# Patient Record
Sex: MALE | Race: WHITE | NOT HISPANIC OR LATINO | URBAN - METROPOLITAN AREA
[De-identification: names, ages, dates, MRNs, and addresses within clinical notes are randomized per-mention and may not be internally consistent; named-entity substitution may affect disease eponyms.]

---

## 2017-01-26 ENCOUNTER — ALLSCRIPTS OFFICE VISIT (OUTPATIENT)
Dept: OTHER | Facility: OTHER | Age: 67
End: 2017-01-26

## 2017-03-16 ENCOUNTER — ALLSCRIPTS OFFICE VISIT (OUTPATIENT)
Dept: OTHER | Facility: OTHER | Age: 67
End: 2017-03-16

## 2017-04-05 ENCOUNTER — ALLSCRIPTS OFFICE VISIT (OUTPATIENT)
Dept: OTHER | Facility: OTHER | Age: 67
End: 2017-04-05

## 2017-04-10 ENCOUNTER — ALLSCRIPTS OFFICE VISIT (OUTPATIENT)
Dept: OTHER | Facility: OTHER | Age: 67
End: 2017-04-10

## 2017-05-11 ENCOUNTER — LAB REQUISITION (OUTPATIENT)
Dept: LAB | Facility: HOSPITAL | Age: 67
End: 2017-05-11
Payer: MEDICARE

## 2017-05-11 ENCOUNTER — ALLSCRIPTS OFFICE VISIT (OUTPATIENT)
Dept: OTHER | Facility: OTHER | Age: 67
End: 2017-05-11

## 2017-05-11 DIAGNOSIS — Z12.5 ENCOUNTER FOR SCREENING FOR MALIGNANT NEOPLASM OF PROSTATE: ICD-10-CM

## 2017-05-11 DIAGNOSIS — M25.50 PAIN IN JOINT: ICD-10-CM

## 2017-05-11 DIAGNOSIS — E03.9 HYPOTHYROIDISM: ICD-10-CM

## 2017-05-11 DIAGNOSIS — I10 ESSENTIAL (PRIMARY) HYPERTENSION: ICD-10-CM

## 2017-05-11 DIAGNOSIS — M10.9 GOUT: ICD-10-CM

## 2017-05-11 DIAGNOSIS — M54.50 LOW BACK PAIN: ICD-10-CM

## 2017-05-11 DIAGNOSIS — Z95.2 PRESENCE OF PROSTHETIC HEART VALVE: ICD-10-CM

## 2017-05-11 DIAGNOSIS — M25.512 PAIN IN LEFT SHOULDER: ICD-10-CM

## 2017-05-11 DIAGNOSIS — G47.33 OBSTRUCTIVE SLEEP APNEA: ICD-10-CM

## 2017-05-11 DIAGNOSIS — E11.49 TYPE 2 DIABETES MELLITUS WITH OTHER DIABETIC NEUROLOGICAL COMPLICATION (HCC): ICD-10-CM

## 2017-05-11 DIAGNOSIS — M15.9 POLYOSTEOARTHRITIS: ICD-10-CM

## 2017-05-11 DIAGNOSIS — I08.0 RHEUMATIC DISORDER OF BOTH MITRAL AND AORTIC VALVES: ICD-10-CM

## 2017-05-11 LAB
ALBUMIN SERPL BCP-MCNC: 4.2 G/DL (ref 3.5–5)
ALP SERPL-CCNC: 61 U/L (ref 46–116)
ALT SERPL W P-5'-P-CCNC: 24 U/L (ref 12–78)
ANION GAP SERPL CALCULATED.3IONS-SCNC: 8 MMOL/L (ref 4–13)
AST SERPL W P-5'-P-CCNC: 17 U/L (ref 5–45)
BASOPHILS # BLD AUTO: 0.1 THOUSANDS/ΜL (ref 0–0.1)
BASOPHILS NFR BLD AUTO: 1 % (ref 0–1)
BILIRUB SERPL-MCNC: 0.6 MG/DL (ref 0.2–1)
BILIRUB UR QL STRIP: NEGATIVE
BUN SERPL-MCNC: 18 MG/DL (ref 5–25)
CALCIUM SERPL-MCNC: 9.5 MG/DL (ref 8.3–10.1)
CHLORIDE SERPL-SCNC: 96 MMOL/L (ref 100–108)
CHOLEST SERPL-MCNC: 184 MG/DL (ref 50–200)
CLARITY UR: NORMAL
CO2 SERPL-SCNC: 28 MMOL/L (ref 21–32)
COLOR UR: CLEAR
CREAT SERPL-MCNC: 0.89 MG/DL (ref 0.6–1.3)
EOSINOPHIL # BLD AUTO: 0.1 THOUSAND/ΜL (ref 0–0.61)
EOSINOPHIL NFR BLD AUTO: 1 % (ref 0–6)
ERYTHROCYTE [DISTWIDTH] IN BLOOD BY AUTOMATED COUNT: 13.4 % (ref 11.6–15.1)
ERYTHROCYTE [SEDIMENTATION RATE] IN BLOOD: 17 MM/HOUR (ref 2–10)
EST. AVERAGE GLUCOSE BLD GHB EST-MCNC: 123 MG/DL
GFR SERPL CREATININE-BSD FRML MDRD: >60 ML/MIN/1.73SQ M
GLUCOSE (HISTORICAL): NORMAL
GLUCOSE SERPL-MCNC: 159 MG/DL (ref 65–140)
HBA1C MFR BLD: 5.9 % (ref 4.2–6.3)
HCT VFR BLD AUTO: 42.3 % (ref 42–52)
HDLC SERPL-MCNC: 71 MG/DL (ref 40–60)
HGB BLD-MCNC: 14.2 G/DL (ref 14–18)
HGB UR QL STRIP.AUTO: NEGATIVE
KETONES UR STRIP-MCNC: NEGATIVE MG/DL
LDLC SERPL CALC-MCNC: 101 MG/DL (ref 0–100)
LEUKOCYTE ESTERASE UR QL STRIP: NEGATIVE
LYMPHOCYTES # BLD AUTO: 1.7 THOUSANDS/ΜL (ref 0.6–4.47)
LYMPHOCYTES NFR BLD AUTO: 27 % (ref 14–44)
MCH RBC QN AUTO: 34.1 PG (ref 27–31)
MCHC RBC AUTO-ENTMCNC: 33.6 G/DL (ref 31.4–37.4)
MCV RBC AUTO: 101 FL (ref 82–98)
MONOCYTES # BLD AUTO: 0.6 THOUSAND/ΜL (ref 0.17–1.22)
MONOCYTES NFR BLD AUTO: 9 % (ref 4–12)
NEUTROPHILS # BLD AUTO: 4 THOUSANDS/ΜL (ref 1.85–7.62)
NEUTS SEG NFR BLD AUTO: 62 % (ref 43–75)
NITRITE UR QL STRIP: NEGATIVE
NRBC BLD AUTO-RTO: 0 /100 WBCS
OCCULT BLD, FECAL IMMUNOLOGICAL (HISTORICAL): NEGATIVE
PH UR STRIP.AUTO: 5 [PH]
PLATELET # BLD AUTO: 242 THOUSANDS/UL (ref 130–400)
PMV BLD AUTO: 8.4 FL (ref 8.9–12.7)
POTASSIUM SERPL-SCNC: 4.8 MMOL/L (ref 3.5–5.3)
PROT SERPL-MCNC: 7.5 G/DL (ref 6.4–8.2)
PROT UR STRIP-MCNC: NEGATIVE MG/DL
PSA SERPL-MCNC: 1.1 NG/ML (ref 0–4)
RBC # BLD AUTO: 4.18 MILLION/UL (ref 4.7–6.1)
SODIUM SERPL-SCNC: 132 MMOL/L (ref 136–145)
SP GR UR STRIP.AUTO: 1.01
TRIGL SERPL-MCNC: 58 MG/DL
TSH SERPL DL<=0.05 MIU/L-ACNC: 1.13 UIU/ML (ref 0.36–3.74)
URATE SERPL-MCNC: 6 MG/DL (ref 4.2–8)
UROBILINOGEN UR QL STRIP.AUTO: NORMAL
WBC # BLD AUTO: 6.4 THOUSAND/UL (ref 4.8–10.8)

## 2017-05-11 PROCEDURE — 36415 COLL VENOUS BLD VENIPUNCTURE: CPT | Performed by: FAMILY MEDICINE

## 2017-05-11 PROCEDURE — 85025 COMPLETE CBC W/AUTO DIFF WBC: CPT | Performed by: FAMILY MEDICINE

## 2017-05-11 PROCEDURE — 85652 RBC SED RATE AUTOMATED: CPT | Performed by: FAMILY MEDICINE

## 2017-05-11 PROCEDURE — 80061 LIPID PANEL: CPT | Performed by: FAMILY MEDICINE

## 2017-05-11 PROCEDURE — G0103 PSA SCREENING: HCPCS | Performed by: FAMILY MEDICINE

## 2017-05-11 PROCEDURE — 84443 ASSAY THYROID STIM HORMONE: CPT | Performed by: FAMILY MEDICINE

## 2017-05-11 PROCEDURE — 80053 COMPREHEN METABOLIC PANEL: CPT | Performed by: FAMILY MEDICINE

## 2017-05-11 PROCEDURE — 83036 HEMOGLOBIN GLYCOSYLATED A1C: CPT | Performed by: FAMILY MEDICINE

## 2017-05-11 PROCEDURE — 84550 ASSAY OF BLOOD/URIC ACID: CPT | Performed by: FAMILY MEDICINE

## 2017-05-13 ENCOUNTER — GENERIC CONVERSION - ENCOUNTER (OUTPATIENT)
Dept: OTHER | Facility: OTHER | Age: 67
End: 2017-05-13

## 2017-08-10 ENCOUNTER — ALLSCRIPTS OFFICE VISIT (OUTPATIENT)
Dept: OTHER | Facility: OTHER | Age: 67
End: 2017-08-10

## 2017-08-10 DIAGNOSIS — E11.49 TYPE 2 DIABETES MELLITUS WITH OTHER DIABETIC NEUROLOGICAL COMPLICATION (HCC): ICD-10-CM

## 2017-08-10 LAB — HBA1C MFR BLD HPLC: 4.9 %

## 2017-10-17 ENCOUNTER — LAB REQUISITION (OUTPATIENT)
Dept: LAB | Facility: HOSPITAL | Age: 67
End: 2017-10-17
Payer: MEDICARE

## 2017-10-17 ENCOUNTER — ALLSCRIPTS OFFICE VISIT (OUTPATIENT)
Dept: OTHER | Facility: OTHER | Age: 67
End: 2017-10-17

## 2017-10-17 DIAGNOSIS — T39.395A ADVERSE EFFECT OF OTHER NONSTEROIDAL ANTI-INFLAMMATORY DRUGS (NSAID), INITIAL ENCOUNTER: ICD-10-CM

## 2017-10-17 DIAGNOSIS — M15.9 POLYOSTEOARTHRITIS: ICD-10-CM

## 2017-10-17 DIAGNOSIS — Z01.818 ENCOUNTER FOR OTHER PREPROCEDURAL EXAMINATION: ICD-10-CM

## 2017-10-17 DIAGNOSIS — Z95.2 PRESENCE OF PROSTHETIC HEART VALVE: ICD-10-CM

## 2017-10-17 DIAGNOSIS — G47.33 OBSTRUCTIVE SLEEP APNEA: ICD-10-CM

## 2017-10-17 DIAGNOSIS — I08.0 RHEUMATIC DISORDER OF BOTH MITRAL AND AORTIC VALVES: ICD-10-CM

## 2017-10-17 DIAGNOSIS — E03.9 HYPOTHYROIDISM: ICD-10-CM

## 2017-10-17 DIAGNOSIS — E11.49 TYPE 2 DIABETES MELLITUS WITH OTHER DIABETIC NEUROLOGICAL COMPLICATION (CODE): ICD-10-CM

## 2017-10-17 DIAGNOSIS — I10 ESSENTIAL (PRIMARY) HYPERTENSION: ICD-10-CM

## 2017-10-17 LAB
ALBUMIN SERPL BCP-MCNC: 4.2 G/DL (ref 3.5–5)
ALP SERPL-CCNC: 65 U/L (ref 46–116)
ALT SERPL W P-5'-P-CCNC: 27 U/L (ref 12–78)
ANION GAP SERPL CALCULATED.3IONS-SCNC: 9 MMOL/L (ref 4–13)
AST SERPL W P-5'-P-CCNC: 16 U/L (ref 5–45)
BASOPHILS # BLD AUTO: 0.1 THOUSANDS/ΜL (ref 0–0.1)
BASOPHILS NFR BLD AUTO: 1 % (ref 0–1)
BILIRUB SERPL-MCNC: 0.7 MG/DL (ref 0.2–1)
BUN SERPL-MCNC: 18 MG/DL (ref 5–25)
CALCIUM SERPL-MCNC: 9.7 MG/DL (ref 8.3–10.1)
CHLORIDE SERPL-SCNC: 98 MMOL/L (ref 100–108)
CHOLEST SERPL-MCNC: 178 MG/DL (ref 50–200)
CO2 SERPL-SCNC: 28 MMOL/L (ref 21–32)
CREAT SERPL-MCNC: 0.9 MG/DL (ref 0.6–1.3)
CREAT UR-MCNC: 36.3 MG/DL
EOSINOPHIL # BLD AUTO: 0.1 THOUSAND/ΜL (ref 0–0.61)
EOSINOPHIL NFR BLD AUTO: 2 % (ref 0–6)
ERYTHROCYTE [DISTWIDTH] IN BLOOD BY AUTOMATED COUNT: 13.8 % (ref 11.6–15.1)
EST. AVERAGE GLUCOSE BLD GHB EST-MCNC: 100 MG/DL
GFR SERPL CREATININE-BSD FRML MDRD: 87 ML/MIN/1.73SQ M
GLUCOSE SERPL-MCNC: 123 MG/DL (ref 65–140)
HBA1C MFR BLD: 5.1 % (ref 4.2–6.3)
HCT VFR BLD AUTO: 43.4 % (ref 42–52)
HDLC SERPL-MCNC: 68 MG/DL (ref 40–60)
HGB BLD-MCNC: 14.6 G/DL (ref 14–18)
LDLC SERPL CALC-MCNC: 97 MG/DL (ref 0–100)
LYMPHOCYTES # BLD AUTO: 2.1 THOUSANDS/ΜL (ref 0.6–4.47)
LYMPHOCYTES NFR BLD AUTO: 26 % (ref 14–44)
MCH RBC QN AUTO: 34.7 PG (ref 27–31)
MCHC RBC AUTO-ENTMCNC: 33.7 G/DL (ref 31.4–37.4)
MCV RBC AUTO: 103 FL (ref 82–98)
MICROALBUMIN UR-MCNC: 10.8 MG/L (ref 0–20)
MICROALBUMIN/CREAT 24H UR: 30 MG/G CREATININE (ref 0–30)
MONOCYTES # BLD AUTO: 0.7 THOUSAND/ΜL (ref 0.17–1.22)
MONOCYTES NFR BLD AUTO: 8 % (ref 4–12)
NEUTROPHILS # BLD AUTO: 5 THOUSANDS/ΜL (ref 1.85–7.62)
NEUTS SEG NFR BLD AUTO: 64 % (ref 43–75)
NRBC BLD AUTO-RTO: 0 /100 WBCS
PLATELET # BLD AUTO: 243 THOUSANDS/UL (ref 130–400)
PMV BLD AUTO: 8.3 FL (ref 8.9–12.7)
POTASSIUM SERPL-SCNC: 4.9 MMOL/L (ref 3.5–5.3)
PROT SERPL-MCNC: 7.6 G/DL (ref 6.4–8.2)
RBC # BLD AUTO: 4.21 MILLION/UL (ref 4.7–6.1)
SODIUM SERPL-SCNC: 135 MMOL/L (ref 136–145)
TRIGL SERPL-MCNC: 65 MG/DL
TSH SERPL DL<=0.05 MIU/L-ACNC: 1.79 UIU/ML (ref 0.36–3.74)
WBC # BLD AUTO: 7.9 THOUSAND/UL (ref 4.8–10.8)

## 2017-10-17 PROCEDURE — 80061 LIPID PANEL: CPT | Performed by: FAMILY MEDICINE

## 2017-10-17 PROCEDURE — 84443 ASSAY THYROID STIM HORMONE: CPT | Performed by: FAMILY MEDICINE

## 2017-10-17 PROCEDURE — 80053 COMPREHEN METABOLIC PANEL: CPT | Performed by: FAMILY MEDICINE

## 2017-10-17 PROCEDURE — 85025 COMPLETE CBC W/AUTO DIFF WBC: CPT | Performed by: FAMILY MEDICINE

## 2017-10-17 PROCEDURE — 82570 ASSAY OF URINE CREATININE: CPT | Performed by: FAMILY MEDICINE

## 2017-10-17 PROCEDURE — 83036 HEMOGLOBIN GLYCOSYLATED A1C: CPT | Performed by: FAMILY MEDICINE

## 2017-10-17 PROCEDURE — 82043 UR ALBUMIN QUANTITATIVE: CPT | Performed by: FAMILY MEDICINE

## 2017-10-18 ENCOUNTER — GENERIC CONVERSION - ENCOUNTER (OUTPATIENT)
Dept: OTHER | Facility: OTHER | Age: 67
End: 2017-10-18

## 2017-10-18 NOTE — PROGRESS NOTES
Assessment  1  Type 2 diabetes mellitus with other neurologic complication (144 32) (L57 98)   2  Hypertension (401 9) (I10)   3  Generalized osteoarthrosis, involving multiple sites (715 09) (M15 9)   4  Mitral and aortic valve disease (396 9) (I08 0)   5  Status post aortic valve replacement (V43 3) (Z95 2)   6  Adverse reaction to NSAIDs (E935 8) (T39 395A)   7  Pre-operative clearance (V72 84) (Z01 818)   8  Acquired hypothyroidism (244 9) (E03 9)   9  Obstructive sleep apnea (327 23) (G47 33)    Plan  Screening for genitourinary condition    · *VB - Urinary Incontinence Screen (Dx Z13 89 Screen for UI); Status:Complete -  Retrospective Authorization;   Done: 08PZS3304 09:57AM    Discussion/Summary  Surgical Clearance: He is at a LOW TO MODERATE risk from a cardiovascular standpoint at this time without any additional cardiac testing  Reevaluation needed, if he should present with symptoms prior to surgery/procedure  THERE ARE NO OBVIOUS MEDICAL CONTRAINDICATIONS FOR SURGERY UNDER LOCAL ANESTHESIA WITH SEDATIONPROPHYLAXIS SHOULD BE GIVEN AS WELL AS DVT PROPHYLAXIS SHOULD BE CONSIDERED  The treatment plan was reviewed with the patient/guardian  The patient/guardian understands and agrees with the treatment plan      Chief Complaint  Patient is here today for a surgical clearance for surgery on 10/30/2017 at [facility], right foot resection, L Phipps/LPN      History of Present Illness  Pre-Op Visit (Brief): The patient is being seen for a preoperative visit  The procedure is a(n) FOOT RESECTION scheduled for 10/30/2017 with [facility]  The indication for surgery is RECURRENT CALLOUS AND INFECTION  Surgical Risk Assessment:   Prior Anesthesia: He had prior anesthesia,-- no prior adverse reaction to edidural anesthesia,-- no prior adverse reaction to spinal anesthesia-- and-- no prior adverse reaction to general anesthesia   Pertinent Past Medical History: DM, HTN, HYPERLIPIDEMIA, HYPOTHYROIDISM, SLEEP APNEA, AORTIC VALVE REPLACEMENT  Exercise Capacity: able to walk four blocks without symptoms-- and-- able to walk two flights of stairs without symptoms  Lifestyle Factors: denies tobacco use and denies illegal drug use  Symptoms: no symptoms,-- no easy bleeding,-- no easy bruising,-- no chest pain,-- no cough,-- no dyspnea,-- no edema,-- no palpitations-- and-- no wheezing  Living Situation: home is secure and supportive  HPI: PATIENT COMES IN FOR PREOPERATIVE CLEARANCE FOR ELECTIVE FOOT RESECTION  RECORD REVIEWED      Review of Systems    Constitutional: no fever-- and-- not feeling tired  Eyes: no eyesight problems  ENT: no sore throat-- and-- no nasal discharge  Cardiovascular: no chest pain,-- the heart rate was not fast,-- no palpitations-- and-- no extremity edema  Respiratory: no shortness of breath,-- no cough,-- no wheezing-- and-- no shortness of breath during exertion  Gastrointestinal: no abdominal pain,-- no nausea,-- no vomiting-- and-- no diarrhea  Genitourinary: nocturia  Musculoskeletal: arthralgias-- and-- joint stiffness  Integumentary: no rashes-- and-- no skin wound  Neurological: no headache,-- no numbness,-- no tingling-- and-- no dizziness  Psychiatric: no anxiety  Endocrine: no muscle weakness  Hematologic/Lymphatic: no swollen glands  ROS reviewed  Active Problems  1  Acquired hypothyroidism (244 9) (E03 9)   2  Adverse reaction to NSAIDs (E935 8) (T39 395A)   3  Arthralgia of multiple sites (719 49) (M25 50)   4  Diabetes (250 00) (E11 9)   5  Erectile dysfunction, unspecified erectile dysfunction type (607 84) (N52 9)   6  Generalized osteoarthrosis, involving multiple sites (715 09) (M15 9)   7  Gout (274 9) (M10 9)   8  Hearing loss (389 9) (H91 90)   9  Hypertension (401 9) (I10)   10  Mitral and aortic valve disease (396 9) (I08 0)   11  Obstructive sleep apnea (327 23) (G47 33)   12  Status post aortic valve replacement (V43 3) (Z95 2)   13   Tinea corporis (110 5) (B35 4)   14  Type 2 diabetes mellitus with other neurologic complication (521 78) (K02 87)    Past Medical History   · History of Chronic left shoulder pain (847 02,550 19) (M25 512,G89 29)   · History of Congenital stenosis of aortic valve (746 3) (Q23 0)   · History of hyperthyroidism (V12 29) (Z86 39)    The active problems and past medical history were reviewed and updated today  Surgical History   · History of Aortic Valve Replacement   · History of Foot Surgery Right   · History of Heart Surgery   · History of Inguinal Hernia Repair    The surgical history was reviewed and updated today  Family History  Mother    · Family history of   Father    · Family history of    · Family history of cardiac disorder (V17 49) (Z82 49)   · Family history of hypertension (V17 49) (Z82 49)  Family History    · Family history of Does not use illicit drugs   · Denied: Family history of mental disorder    The family history was reviewed and updated today  Social History   · Caffeine use (V49 89) (F15 90)   · Dental care, regularly   · Does not use illicit drugs (I35 35) (Z78 9)   · Drinks coffee   · Minimum alcohol consumption   · Never a smoker   · No advance directives (V49 89) (Z78 9)   · No advance directives (V49 89) (Z78 9)  The social history was reviewed and updated today  Current Meds   1  Allopurinol 100 MG Oral Tablet; Take 1 tablet daily; Therapy: 62EMH8801 to (Evaluate:73Qjr8008)  Requested for: 19Edt3250; Last   Rx:10Sdc9610 Ordered   2  AmLODIPine Besylate 10 MG Oral Tablet; TAKE ONE TABLET BY MOUTH ONCE DAILY   IN THE MORNING; Therapy: 02ZJE0584 to (Kenisha Hankins)  Requested for: 25Sgs1393; Last   Rx:64Bty5842; Status: ACTIVE - Transmit to Pharmacy - Awaiting Verification Ordered   3  Aspirin Adult Low Dose 81 MG Oral Tablet Delayed Release; TAKE 1 TABLET DAILY; Therapy: (Recorded:2017) to Recorded   4   Atorvastatin Calcium 10 MG Oral Tablet; 1 Every Day At Bedtime; Therapy: (Recorded:11May2015) to Recorded   5  Cialis 20 MG Oral Tablet; TAKE 1 TABLET 1 HOUR BEFORE ACTIVITY AS NEEDED; Therapy: 98SER9652 to (Last Rx:09Jan2017)  Requested for: 53DHO4366 Ordered   6  GlipiZIDE ER 5 MG Oral Tablet Extended Release 24 Hour; TAKE ONE TABLET BY   MOUTH TWICE DAILY FOR  90  DAYS; Therapy: 04Ggs0627 to (Inna Fairly)  Requested for: 22Vix4487; Last   Rx:07Zxw7632 Ordered   7  Levothyroxine Sodium 175 MCG Oral Tablet; TAKE ONE TABLET BY MOUTH ONCE   DAILY TAKE  ON  AN  EMPTY  STOMACH  WITH  FULL    GLASS  OF  WATER; Therapy: 24NFK5809 to (Awais Paget)  Requested for: 01Sep2017; Last   Rx:01Sep2017 Ordered   8  Lisinopril 40 MG Oral Tablet; TAKE ONE TABLET BY MOUTH ONCE DAILY; Therapy: 87Njs2878 to (Inna Fairly)  Requested for: 66Wdd7784; Last   Rx:75Exv9887; Status: ACTIVE - Transmit to Pharmacy - Awaiting Verification Ordered   9  Meloxicam 15 MG Oral Tablet; TAKE 1 TABLET DAILY WITH FOOD; Therapy: 31UYZ5360 to (Ángel Noble)  Requested for: 44ZZC7620; Last   Rx:11May2017 Ordered   10  MetFORMIN HCl - 1000 MG Oral Tablet; TAKE ONE TABLET BY MOUTH ONCE DAILY; Therapy: 98KRO5487 to (Inna Fairly)  Requested for: 67Dld0880; Last    Rx:53Vlx2563 Ordered   11  Metoprolol Succinate ER 25 MG Oral Tablet Extended Release 24 Hour; 1 every    morning; Therapy: (Recorded:11May2015) to Recorded   12  OneTouch Ultra Blue In [employer]; twice daily DX CODE 250 two times daily  Requested    for: 10Aug2017; Last Rx:10Aug2017 Ordered   13  Plavix 75 MG Oral Tablet; Take 1 tablet daily; Therapy: (Recorded:16Mar2017) to Recorded    The medication list was reviewed and updated today  Allergies  1   Sulindac TABS    Vitals   Recorded: 02ITN6574 10:23AM Recorded: 06WFV8738 09:41AM   Temperature  97 6 F, Temporal   Heart Rate  72, R PT   Pulse Quality  Normal, R PT   Respiration Quality  Normal   Respiration  16   Systolic 007 594, LUE, Sitting   Diastolic 78 80, LUE, Sitting   Height  6 ft    Weight  247 lb    BMI Calculated  33 5   BSA Calculated  2 33   O2 Saturation  98     Physical Exam    Constitutional   General appearance: No acute distress, well appearing and well nourished  Head and Face   Head and face: Normal     Eyes   Conjunctiva and lids: No erythema, swelling or discharge  Pupils and irises: Equal, round, reactive to light  Ears, Nose, Mouth, and Throat   External inspection of ears and nose: Normal     Otoscopic examination: Tympanic membranes translucent with normal light reflex  Canals patent without erythema  Nasal mucosa, septum, and turbinates: Normal without edema or erythema  Lips, teeth, and gums: Normal, good dentition  Oropharynx: Normal with no erythema, edema, exudate or lesions  Neck   Neck: Supple, symmetric, trachea midline, no masses  Thyroid: Normal, no thyromegaly  Pulmonary   Respiratory effort: No increased work of breathing or signs of respiratory distress  Auscultation of lungs: Clear to auscultation  Cardiovascular   Auscultation of heart: Normal rate and rhythm, normal S1 and S2, no murmurs  -- SOFT RICHARD AT THE BASE  Carotid pulses: 2+ bilaterally  -- NO BRUITS  Abdominal aorta: Normal     Femoral pulses: 2+ bilaterally  Pedal pulses: 2+ bilaterally  Peripheral vascular exam: Normal     Examination of extremities for edema and/or varicosities: Abnormal  -- MILD STASIS CHANGES  Abdomen   Abdomen: Non-tender, no masses  Liver and spleen: No hepatomegaly or splenomegaly  Lymphatic   Palpation of lymph nodes in neck: No lymphadenopathy  Palpation of lymph nodes in axillae: No lymphadenopathy  Palpation of lymph nodes in groin: No lymphadenopathy  Musculoskeletal   Gait and station: Normal     Inspection/palpation of digits and nails: Normal without clubbing or cyanosis  -- MILD DJD CHANGES     Inspection/palpation of joints, bones, and muscles: Normal  -- MILD DJD CHANGES  Range of motion: Normal     Stability: Normal     Muscle strength/tone: Normal     Skin   Skin and subcutaneous tissue: Normal without rashes or lesions  Palpation of skin and subcutaneous tissue: Normal turgor  Neurologic   Cranial nerves: Cranial nerves 2-12 intact  Cortical function: Normal mental status  Reflexes: 2+ and symmetric  Diabetic Foot Exam: Socks and shoes removed, Right Foot Findings: normal foot  The toes were swollen  The sensory exam showed diminished vibratory sensation at the level of the toes-- and-- diminished position sense at the level of the toes  Socks and Shoes removed, Left Foot Findings: normal foot  The toes were swollen  Monofilament Testing: diminished tactile sensation with monofilament testing throughout both feet  Vascular: Pulses: 2+ in the posterior tibialis-- and-- 2+ in the dorsalis pedis  Pulses: 1+ in the posterior tibialis-- and-- 1+ in the dorsalis pedis  Assign Risk Category: 2: Loss of protective sensation with or without weakness, deformity, callus, pre-ulcer, or history of ulceration  High risk  Psychiatric   Judgment and insight: Normal     Orientation to person, place and time: Normal     Mood and affect: Normal        Results/Data  *VB - Urinary Incontinence Screen (Dx Z13 89 Screen for UI) 71CPI1002 09:57AM Thao Cummings     Test Name Result Flag Reference   Urinary Incontinence Assessment 47GLZ2212         End of Encounter Meds  1  Meloxicam 15 MG Oral Tablet; TAKE 1 TABLET DAILY WITH FOOD; Therapy: 15PZC4200 to (Jazmine Currie)  Requested for: 87MAP3107; Last   Rx:08Jqe1796 Ordered  2  GlipiZIDE ER 5 MG Oral Tablet Extended Release 24 Hour; TAKE ONE TABLET BY   MOUTH TWICE DAILY FOR  90  DAYS; Therapy: 38Ite9573 to (Maria Teresa Sandra)  Requested for: 10Xci1440; Last   Rx:46Syh2066 Ordered   3  MetFORMIN HCl - 1000 MG Oral Tablet; TAKE ONE TABLET BY MOUTH ONCE DAILY;    Therapy: 85VPH3994 to (Evaluate:29Nov2017) Requested for: 32Hua3250; Last   Rx:07Yrn6668 Ordered   4  OneTouch Ultra Blue In [employer]; twice daily DX CODE 250 two times daily  Requested   for: 10Aug2017; Last Rx:10Aug2017 Ordered  5  Cialis 20 MG Oral Tablet; TAKE 1 TABLET 1 HOUR BEFORE ACTIVITY AS NEEDED; Therapy: 61TYA2865 to (Last Rx:09Jan2017)  Requested for: 90SDB0074 Ordered  6  Allopurinol 100 MG Oral Tablet; Take 1 tablet daily; Therapy: 93DRJ6955 to (Evaluate:61Hso1181)  Requested for: 07Sep2017; Last   UC:72RBM0847 Ordered  7  AmLODIPine Besylate 10 MG Oral Tablet; TAKE ONE TABLET BY MOUTH ONCE DAILY   IN THE MORNING; Therapy: 80OCP0671 to (Tiffany Wilson)  Requested for: 53Isg4226; Last   Rx:62Kgp0129; Status: ACTIVE - Transmit to Pharmacy - Awaiting Verification Ordered   8  Lisinopril 40 MG Oral Tablet; TAKE ONE TABLET BY MOUTH ONCE DAILY; Therapy: 18Igq3156 to (Tiffany Wilson)  Requested for: 31Aug2017; Last   Rx:78Rmc8971; Status: ACTIVE - Transmit to Pharmacy - Awaiting Verification Ordered  9  Levothyroxine Sodium 175 MCG Oral Tablet; TAKE ONE TABLET BY MOUTH ONCE   DAILY TAKE  ON  AN  EMPTY  STOMACH  WITH  FULL    GLASS  OF  WATER; Therapy: 00AMQ3590 to (Jeremy Chen)  Requested for: 01Sep2017; Last   Rx:31Lyj3768 Ordered  10  Aspirin Adult Low Dose 81 MG Oral Tablet Delayed Release; TAKE 1 TABLET DAILY; Therapy: (Recorded:16Mar2017) to Recorded   11  Atorvastatin Calcium 10 MG Oral Tablet; 1 Every Day At Bedtime; Therapy: (Recorded:10Qiw6840) to Recorded   12  Metoprolol Succinate ER 25 MG Oral Tablet Extended Release 24 Hour; 1 every    morning; Therapy: (Recorded:84Pgj8415) to Recorded   13  Plavix 75 MG Oral Tablet (Clopidogrel Bisulfate); Take 1 tablet daily;     Therapy: (Recorded:16Mar2017) to Recorded    Future Appointments    Date/Time Provider Specialty Site   02/08/2018 09:45 AM Jomarie Saint, MD Family Medicine [facility]     Signatures   Electronically signed by : Tanvi Ames MD; Oct 17 2017 10:26AM EST                       (Author)

## 2017-11-13 ENCOUNTER — GENERIC CONVERSION - ENCOUNTER (OUTPATIENT)
Dept: OTHER | Facility: OTHER | Age: 67
End: 2017-11-13

## 2018-01-12 VITALS
SYSTOLIC BLOOD PRESSURE: 140 MMHG | RESPIRATION RATE: 16 BRPM | HEIGHT: 71 IN | OXYGEN SATURATION: 98 % | TEMPERATURE: 96.8 F | BODY MASS INDEX: 35.7 KG/M2 | DIASTOLIC BLOOD PRESSURE: 80 MMHG | WEIGHT: 255 LBS | HEART RATE: 72 BPM

## 2018-01-12 NOTE — RESULT NOTES
Message   PLEASE CALL   CHECK FOR PARASITES WAS NEG   HOW IS HE FEELING? Verified Results  (1) OVA AND PARASITES EXAM 55Ews3397 12:00PM Glen Waddell     Test Name Result Flag Reference   O&P CONC  EXAM      No ova, cysts, or parasites seen     One negative specimen does not rule out the possibility of a  parasitic infection     Performed at:  [facility] [address]  [id number]  : Ken Cintron MD, Phone:  [phone]

## 2018-01-12 NOTE — RESULT NOTES
Message   PLEASE CALL EUGENIA MARIN   HGB A1C WAS 5 6 - EXCELLENT!!   CHOL    GOOD NUMBER   KEEP UP THE GOOD WORK   THANKS   RE CHECK IN 3-6 MONTHS        Verified Results  (1) CBC/PLT/DIFF 60ATF5625 04:00PM Oni Dillon     Test Name Result Flag Reference   WBC COUNT 6 90 Thousand/uL  4 80-10 80   WBC ADJUSTED 6 90 Thousand/uL  4 80-10 80   RBC COUNT 4 41 Million/uL L 4 70-6 10   HEMOGLOBIN 14 7 g/dL  14 0-18 0   HEMATOCRIT 43 9 %  42 0-52 0    fL H 82-98   MCH 33 5 pg H 27 0-31 0   MCHC 33 6 g/dL  31 4-37 4   RDW 12 8 %  11 6-15 1   MPV 8 3 fL L 8 9-12 7   PLATELET COUNT 384 Thousands/uL  130-400   NEUTROPHILS RELATIVE PERCENT 56 %  43-75   LYMPHOCYTES RELATIVE PERCENT 31 %  14-44   MONOCYTES RELATIVE PERCENT 11 %  4-12   EOSINOPHILS RELATIVE PERCENT 1 %  0-6   BASOPHILS RELATIVE PERCENT 1 %  0-1   NEUTROPHILS ABSOLUTE COUNT 3 90 Thousands/?L  1 85-7 62   LYMPHOCYTES ABSOLUTE COUNT 2 10 Thousands/?L  0 60-4 47   MONOCYTES ABSOLUTE COUNT 0 70 Thousand/?L  0 17-1 22   EOSINOPHILS ABSOLUTE COUNT 0 10 Thousand/?L  0 00-0 61   BASOPHILS ABSOLUTE COUNT 0 10 Thousands/?L  0 00-0 10     (1) URIC ACID 06CFK1565 04:00PM Oni Dillon     Test Name Result Flag Reference   URIC ACID 5 2 mg/dL  4 2-8 0   Specimen collection should occur prior to Metamizole administration due to the potential for falsely depressed results  (1) COMPREHENSIVE METABOLIC PANEL 56ZTD3710 02:61VW Oni Dillon     Test Name Result Flag Reference   GLUCOSE,RANDM 122 mg/dL     If the patient is fasting, the ADA then defines impaired fasting glucose as > 100 mg/dL and diabetes as > or equal to 123 mg/dL     SODIUM 133 mmol/L L 136-145   POTASSIUM 4 6 mmol/L  3 5-5 3   CHLORIDE 96 mmol/L L 100-108   CARBON DIOXIDE 27 mmol/L  21-32   ANION GAP (CALC) 10 mmol/L  4-13   BLOOD UREA NITROGEN 14 mg/dL  5-25   CREATININE 0 80 mg/dL  0 60-1 30   Standardized to IDMS reference method   CALCIUM 9 3 mg/dL  8 3-10 1   BILI, TOTAL 0 70 mg/dL  0 20-1 00   ALK PHOSPHATAS 69 U/L     ALT (SGPT) 34 U/L  12-78   AST(SGOT) 17 U/L  5-45   ALBUMIN 4 6 g/dL  3 5-5 0   TOTAL PROTEIN 8 1 g/dL  6 4-8 2   eGFR Non-African American      >60 0 ml/min/1 73sq m   [facility] Disease Education Program recommendations are as follows:  GFR calculation is accurate only with a steady state creatinine  Chronic Kidney disease less than 60 ml/min/1 73 sq  meters  Kidney failure less than 15 ml/min/1 73 sq  meters  (1) LIPID PANEL, FASTING 48FZG0987 04:00PM Raymond Tellez     Test Name Result Flag Reference   CHOLESTEROL 188 mg/dL     HDL,DIRECT 70 mg/dL H 40-60   Specimen collection should occur prior to Metamizole administration due to the potential for falsely depressed results  LDL CHOLESTEROL CALCULATED 107 mg/dL H 0-100   Triglyceride:         Normal              <150 mg/dl       Borderline High    150-199 mg/dl       High               200-499 mg/dl       Very High          >499 mg/dl  Cholesterol:         Desirable        <200 mg/dl      Borderline High  200-239 mg/dl      High             >239 mg/dl  HDL Cholesterol:        High    >59 mg/dL      Low     <41 mg/dL  LDL CALCULATED:    This screening LDL is a calculated result  It does not have the accuracy of the Direct Measured LDL in the monitoring of patients with hyperlipidemia and/or statin therapy  Direct Measure LDL (XHF089) must be ordered separately in these patients  TRIGLYCERIDES 57 mg/dL  <=150   Specimen collection should occur prior to N-Acetylcysteine or Metamizole administration due to the potential for falsely depressed results  (1) TSH 55SST1310 04:00PM Raymond Tellez     Test Name Result Flag Reference   TSH 1 050 uIU/mL  0 358-3 740   Patients undergoing fluorescein dye angiography may retain small amounts of fluorescein in the body for 48-72 hours post procedure  Samples containing fluorescein can produce falsely depressed TSH values   If the patient had this procedure,a specimen should be resubmitted post fluorescein clearance  (1) MICROALBUMIN CREATININE RATIO, RANDOM URINE 00AET3163 04:00PM [facility]     Test Name Result Flag Reference   MICROALBUMIN/ CREAT R 16 mg/g creatinine  0-30   MICROALBUMIN,URINE 6 4 mg/L  0 0-20 0   CREATININE URINE 39 2 mg/dL       (1) HEMOGLOBIN A1C 79LTU4567 04:00PM [facility]     Test Name Result Flag Reference   HEMOGLOBIN A1C 5 6 %  4 0-5 6   EST  AVG   GLUCOSE 114 mg/dl     5 7-6 4% impaired fasting glucose  >=6 5% diagnosis of diabetes    Falsely low levels are seen in conditions linked to short RBC life span-  hemolytic anemia, and splenomegaly  Falsely elevated levels are seen in situations where there is an increased production of RBC- receipt of erythropoietin or blood transfusions  Adopted from ADA-Clinical Practice Recommendations

## 2018-01-13 VITALS
WEIGHT: 253 LBS | HEART RATE: 80 BPM | TEMPERATURE: 98.3 F | BODY MASS INDEX: 36.22 KG/M2 | DIASTOLIC BLOOD PRESSURE: 80 MMHG | HEIGHT: 70 IN | SYSTOLIC BLOOD PRESSURE: 140 MMHG | RESPIRATION RATE: 18 BRPM

## 2018-01-13 NOTE — RESULT NOTES
Verified Results  (1) CBC/PLT/DIFF 17Oct2017 10:31AM Sameera Sharma Order Number: [id number]     Test Name Result Flag Reference   WBC COUNT 7 90 Thousand/uL  4 80-10 80   RBC COUNT 4 21 Million/uL L 4 70-6 10   HEMOGLOBIN 14 6 g/dL  14 0-18 0   HEMATOCRIT 43 4 %  42 0-52 0    fL H 82-98   MCH 34 7 pg H 27 0-31 0   MCHC 33 7 g/dL  31 4-37 4   RDW 13 8 %  11 6-15 1   MPV 8 3 fL L 8 9-12 7   PLATELET COUNT 921 Thousands/uL  130-400   nRBC AUTOMATED 0 /100 WBCs     NEUTROPHILS RELATIVE PERCENT 64 %  43-75   LYMPHOCYTES RELATIVE PERCENT 26 %  14-44   MONOCYTES RELATIVE PERCENT 8 %  4-12   EOSINOPHILS RELATIVE PERCENT 2 %  0-6   BASOPHILS RELATIVE PERCENT 1 %  0-1   NEUTROPHILS ABSOLUTE COUNT 5 00 Thousands/? ??L  1 85-7 62   LYMPHOCYTES ABSOLUTE COUNT 2 10 Thousands/? ??L  0 60-4 47   MONOCYTES ABSOLUTE COUNT 0 70 Thousand/? ??L  0 17-1 22   EOSINOPHILS ABSOLUTE COUNT 0 10 Thousand/? ??L  0 00-0 61   BASOPHILS ABSOLUTE COUNT 0 10 Thousands/? ??L  0 00-0 10     (1) COMPREHENSIVE METABOLIC PANEL 29IWD8204 45:66TL Sameera Sharma Order Number: [id number]     Test Name Result Flag Reference   GLUCOSE,RANDM 123 mg/dL     If the patient is fasting, the ADA then defines impaired fasting glucose as > 100 mg/dL and diabetes as > or equal to 123 mg/dL  Specimen collection should occur prior to Sulfasalazine administration due to the potential for falsely depressed results  Specimen collection should occur prior to Sulfapyridine administration due to the potential for falsely elevated results     SODIUM 135 mmol/L L 136-145   POTASSIUM 4 9 mmol/L  3 5-5 3   CHLORIDE 98 mmol/L L 100-108   CARBON DIOXIDE 28 mmol/L  21-32   ANION GAP (CALC) 9 mmol/L  4-13   BLOOD UREA NITROGEN 18 mg/dL  5-25   CREATININE 0 90 mg/dL  0 60-1 30   Standardized to IDMS reference method   CALCIUM 9 7 mg/dL  8 3-10 1   BILI, TOTAL 0 70 mg/dL  0 20-1 00   ALK PHOSPHATAS 65 U/L     ALT (SGPT) 27 U/L  12-78 Specimen collection should occur prior to Sulfasalazine administration due to the potential for falsely depressed results  AST(SGOT) 16 U/L  5-45   Specimen collection should occur prior to Sulfasalazine administration due to the potential for falsely depressed results  ALBUMIN 4 2 g/dL  3 5-5 0   TOTAL PROTEIN 7 6 g/dL  6 4-8 2   eGFR 87 ml/min/1 73sq m     National Kidney Disease Education Program recommendations are as follows:  GFR calculation is accurate only with a steady state creatinine  Chronic Kidney disease less than 60 ml/min/1 73 sq  meters  Kidney failure less than 15 ml/min/1 73 sq  meters  (1) HEMOGLOBIN A1C 43Hcg8211 10:31AM Ray Beans Order Number: [id number]     Test Name Result Flag Reference   HEMOGLOBIN A1C 5 1 %  4 2-6 3   EST  AVG  GLUCOSE 100 mg/dl       (1) MICROALBUMIN CREATININE RATIO, RANDOM URINE 38SCE8072 10:31AM Ray Beans Order Number: [id number]     Test Name Result Flag Reference   MICROALBUMIN/ CREAT R 30 mg/g creatinine  0-30   MICROALBUMIN,URINE 10 8 mg/L  0 0-20 0   CREATININE URINE 36 3 mg/dL       (1) LIPID PANEL, FASTING 20WNI9794 10:31AM Ray Beans Order Number: [id number]     Test Name Result Flag Reference   CHOLESTEROL 178 mg/dL     HDL,DIRECT 68 mg/dL H 40-60   Specimen collection should occur prior to Metamizole administration due to the potential for falsley depressed results  LDL CHOLESTEROL CALCULATED 97 mg/dL  0-100   Triglyceride:        Normal <150 mg/dl   Borderline High 150-199 mg/dl   High 200-499 mg/dl   Very High >499 mg/dl      Cholesterol:       Desirable <200 mg/dl    Borderline High 200-239 mg/dl    High >239 mg/dl      HDL Cholesterol:       High>59 mg/dL    Low <41 mg/dL      This screening LDL is a calculated result  It does not have the accuracy of the Direct Measured LDL in the monitoring of patients with hyperlipidemia and/or statin therapy     Direct Measure LDL (YUD286) must be ordered separately in these patients  TRIGLYCERIDES 65 mg/dL  <=150   Specimen collection should occur prior to N-Acetylcysteine or Metamizole administration due to the potential for falsely depressed results  (1) TSH 17Oct2017 10:31AM Lowell Mckoy Order Number: [id number]     Test Name Result Flag Reference   TSH 1 793 uIU/mL  0 358-3 740   Patients undergoing fluorescein dye angiography may retain small amounts of fluorescein in the body for 48-72 hours post procedure  Samples containing fluorescein can produce falsely depressed TSH values  If the patient had this procedure,a specimen should be resubmitted post fluorescein clearance

## 2018-01-13 NOTE — RESULT NOTES
Verified Results  (1) CBC/PLT/DIFF [id number] 11:14AM Shahzad Merino    Order Number: [id number]     Test Name Result Flag Reference   WBC COUNT 6 40 Thousand/uL  4 80-10 80   RBC COUNT 4 18 Million/uL L 4 70-6 10   HEMOGLOBIN 14 2 g/dL  14 0-18 0   HEMATOCRIT 42 3 %  42 0-52 0    fL H 82-98   MCH 34 1 pg H 27 0-31 0   MCHC 33 6 g/dL  31 4-37 4   RDW 13 4 %  11 6-15 1   MPV 8 4 fL L 8 9-12 7   PLATELET COUNT 135 Thousands/uL  130-400   nRBC AUTOMATED 0 /100 WBCs     NEUTROPHILS RELATIVE PERCENT 62 %  43-75   LYMPHOCYTES RELATIVE PERCENT 27 %  14-44   MONOCYTES RELATIVE PERCENT 9 %  4-12   EOSINOPHILS RELATIVE PERCENT 1 %  0-6   BASOPHILS RELATIVE PERCENT 1 %  0-1   NEUTROPHILS ABSOLUTE COUNT 4 00 Thousands/? ??L  1 85-7 62   LYMPHOCYTES ABSOLUTE COUNT 1 70 Thousands/? ??L  0 60-4 47   MONOCYTES ABSOLUTE COUNT 0 60 Thousand/? ??L  0 17-1 22   EOSINOPHILS ABSOLUTE COUNT 0 10 Thousand/? ??L  0 00-0 61   BASOPHILS ABSOLUTE COUNT 0 10 Thousands/? ??L  0 00-0 10     (1) COMPREHENSIVE METABOLIC PANEL 20XQY9145 42:94[place] Order Number: [id number]     Test Name Result Flag Reference   GLUCOSE,RANDM 159 mg/dL H    If the patient is fasting, the ADA then defines impaired fasting glucose as > 100 mg/dL and diabetes as > or equal to 123 mg/dL     SODIUM 132 mmol/L L 136-145   POTASSIUM 4 8 mmol/L  3 5-5 3   CHLORIDE 96 mmol/L L 100-108   CARBON DIOXIDE 28 mmol/L  21-32   ANION GAP (CALC) 8 mmol/L  4-13   BLOOD UREA NITROGEN 18 mg/dL  5-25   CREATININE 0 89 mg/dL  0 60-1 30   Standardized to IDMS reference method   CALCIUM 9 5 mg/dL  8 3-10 1   BILI, TOTAL 0 60 mg/dL  0 20-1 00   ALK PHOSPHATAS 61 U/L     ALT (SGPT) 24 U/L  12-78   AST(SGOT) 17 U/L  5-45   ALBUMIN 4 2 g/dL  3 5-5 0   TOTAL PROTEIN 7 5 g/dL  6 4-8 2   eGFR Non-African American      >60 0 ml/min/1 73sq [facility] [employer] Disease Education Program recommendations are as follows:  GFR calculation is accurate only with a steady state creatinine  Chronic Kidney disease less than 60 ml/min/1 73 sq  meters  Kidney failure less than 15 ml/min/1 73 sq  meters  (1) HEMOGLOBIN A1C [id number] 11:14AM Adrian Bowling Order Number: [id number]     Test Name Result Flag Reference   HEMOGLOBIN A1C 5 9 %  4 2-6 3   EST  AVG  GLUCOSE 123 mg/dl       (1) LIPID PANEL, FASTING [id number] 11:14AM Adrian Bowling Order Number: [id number]     Test Name Result Flag Reference   CHOLESTEROL 184 mg/dL     HDL,DIRECT 71 mg/dL H 40-60   Specimen collection should occur prior to Metamizole administration due to the potential for falsely depressed results  LDL CHOLESTEROL CALCULATED 101 mg/dL H 0-100   Triglyceride:         Normal              <150 mg/dl       Borderline High    150-199 mg/dl       High               200-499 mg/dl       Very High          >499 mg/dl  Cholesterol:         Desirable        <200 mg/dl      Borderline High  200-239 mg/dl      High             >239 mg/dl  HDL Cholesterol:        High    >59 mg/dL      Low     <41 mg/dL  LDL CALCULATED:    This screening LDL is a calculated result  It does not have the accuracy of the Direct Measured LDL in the monitoring of patients with hyperlipidemia and/or statin therapy  Direct Measure LDL (VNP703) must be ordered separately in these patients  TRIGLYCERIDES 58 mg/dL  <=150   Specimen collection should occur prior to N-Acetylcysteine or Metamizole administration due to the potential for falsely depressed results  (1) TSH 75LYL6842 11:14AM Adrian Bowling Order Number: [id number]     Test Name Result Flag Reference   TSH 1 129 uIU/mL  0 358-3 740   Patients undergoing fluorescein dye angiography may retain small amounts of fluorescein in the body for 48-72 hours post procedure  Samples containing fluorescein can produce falsely depressed TSH values   If the patient had this procedure,a specimen should be resubmitted post fluorescein clearance  (1) SED RATE [id number] 11:14AM KDS Order Number: [id number]     Test Name Result Flag Reference   SED RATE 17 mm/hour H 2-10     (1) URIC ACID [id number] 11:14AM KDS Order Number: [id number]     Test Name Result Flag Reference   URIC ACID 6 0 mg/dL  4 2-8 0   Specimen collection should occur prior to Metamizole administration due to the potential for falsely depressed results  (1) PSA (SCREEN) (Dx V76 44 Screen for Prostate Cancer) 74ONO2987 11:14AM KDS Order Number: [id number]     Test Name Result Flag Reference   PROSTATE SPECIFIC ANTIGEN 1 1 ng/mL  0 0-4 0   American Urological Association Guidelines define biochemical recurrence of prostate cancer as a detectable or rising PSA value post-radical prostatectomy that is greater than or equal to 0 2 ng/mL with a second confirmatory level of greater than or equal to 0 2 ng/mL

## 2018-01-14 VITALS
HEIGHT: 71 IN | DIASTOLIC BLOOD PRESSURE: 72 MMHG | RESPIRATION RATE: 16 BRPM | SYSTOLIC BLOOD PRESSURE: 124 MMHG | WEIGHT: 255 LBS | HEART RATE: 76 BPM | TEMPERATURE: 97.3 F | BODY MASS INDEX: 35.7 KG/M2

## 2018-01-14 VITALS
HEART RATE: 76 BPM | HEIGHT: 70 IN | BODY MASS INDEX: 36.08 KG/M2 | DIASTOLIC BLOOD PRESSURE: 72 MMHG | TEMPERATURE: 97.5 F | RESPIRATION RATE: 16 BRPM | SYSTOLIC BLOOD PRESSURE: 140 MMHG | WEIGHT: 252 LBS

## 2018-01-14 VITALS
HEART RATE: 84 BPM | TEMPERATURE: 97.4 F | RESPIRATION RATE: 16 BRPM | SYSTOLIC BLOOD PRESSURE: 142 MMHG | DIASTOLIC BLOOD PRESSURE: 76 MMHG

## 2018-01-14 VITALS
HEIGHT: 71 IN | HEART RATE: 68 BPM | OXYGEN SATURATION: 98 % | RESPIRATION RATE: 16 BRPM | SYSTOLIC BLOOD PRESSURE: 164 MMHG | WEIGHT: 255 LBS | DIASTOLIC BLOOD PRESSURE: 82 MMHG | BODY MASS INDEX: 35.7 KG/M2 | TEMPERATURE: 97.2 F

## 2018-01-14 VITALS
TEMPERATURE: 97.6 F | HEART RATE: 72 BPM | RESPIRATION RATE: 16 BRPM | DIASTOLIC BLOOD PRESSURE: 78 MMHG | OXYGEN SATURATION: 98 % | SYSTOLIC BLOOD PRESSURE: 138 MMHG | BODY MASS INDEX: 33.46 KG/M2 | WEIGHT: 247 LBS | HEIGHT: 72 IN

## 2018-01-17 NOTE — CONSULTS
Chief Complaint  Patient is here today for a surgical clearance for surgery on 10/30/2017 at [facility], right foot resection, L Phipps/LPN      History of Present Illness  Pre-Op Visit (Brief): The patient is being seen for a preoperative visit  The procedure is a(n) FOOT RESECTION scheduled for 10/30/2017 with ZOË  The indication for surgery is RECURRENT CALLOUS AND INFECTION  Surgical Risk Assessment:   Prior Anesthesia: He had prior anesthesia, no prior adverse reaction to edidural anesthesia, no prior adverse reaction to spinal anesthesia and no prior adverse reaction to general anesthesia  Pertinent Past Medical History: DM, HTN, HYPERLIPIDEMIA, HYPOTHYROIDISM, SLEEP APNEA, AORTIC VALVE REPLACEMENT  Exercise Capacity: able to walk four blocks without symptoms and able to walk two flights of stairs without symptoms  Lifestyle Factors: denies tobacco use and denies illegal drug use  Symptoms: no symptoms, no easy bleeding, no easy bruising, no chest pain, no cough, no dyspnea, no edema, no palpitations and no wheezing  Living Situation: home is secure and supportive  HPI: PATIENT COMES IN FOR PREOPERATIVE CLEARANCE FOR ELECTIVE FOOT RESECTION    MEDICAL RECORD REVIEWED      Review of Systems    Constitutional: no fever and not feeling tired  Eyes: no eyesight problems  ENT: no sore throat and no nasal discharge  Cardiovascular: no chest pain, the heart rate was not fast, no palpitations and no extremity edema  Respiratory: no shortness of breath, no cough, no wheezing and no shortness of breath during exertion  Gastrointestinal: no abdominal pain, no nausea, no vomiting and no diarrhea  Genitourinary: nocturia  Musculoskeletal: arthralgias and joint stiffness  Integumentary: no rashes and no skin wound  Neurological: no headache, no numbness, no tingling and no dizziness  Psychiatric: no anxiety  Endocrine: no muscle weakness  Hematologic/Lymphatic: no swollen glands  ROS reviewed  Active Problems    1  Acquired hypothyroidism (244 9) (E03 9)   2  Adverse reaction to NSAIDs (E935 8) (T39 395A)   3  Arthralgia of multiple sites (719 49) (M25 50)   4  Diabetes (250 00) (E11 9)   5  Erectile dysfunction, unspecified erectile dysfunction type (607 84) (N52 9)   6  Generalized osteoarthrosis, involving multiple sites (715 09) (M15 9)   7  Gout (274 9) (M10 9)   8  Hearing loss (389 9) (H91 90)   9  Hypertension (401 9) (I10)   10  Mitral and aortic valve disease (396 9) (I08 0)   11  Obstructive sleep apnea (327 23) (G47 33)   12  Status post aortic valve replacement (V43 3) (Z95 2)   13  Tinea corporis (110 5) (B35 4)   14  Type 2 diabetes mellitus with other neurologic complication (809 70) (I81 00)    Past Medical History    · History of Chronic left shoulder pain (414 83,895 97) (M25 512,G89 29)   · History of Congenital stenosis of aortic valve (746 3) (Q23 0)   · History of hyperthyroidism (V12 29) (Z86 39)    The active problems and past medical history were reviewed and updated today  Surgical History    · History of Aortic Valve Replacement   · History of Foot Surgery Right   · History of Heart Surgery   · History of Inguinal Hernia Repair    The surgical history was reviewed and updated today  Family History    · Family history of     · Family history of    · Family history of cardiac disorder (V17 49) (Z82 49)   · Family history of hypertension (V17 49) (Z82 49)    · Family history of Does not use illicit drugs   · Denied: Family history of mental disorder    The family history was reviewed and updated today  Social History    · Caffeine use (V49 89) (F15 90)   · Dental care, regularly   · Does not use illicit drugs (W84 42) (Z78 9)   · Drinks coffee   · Minimum alcohol consumption   · Never a smoker   · No advance directives (V49 89) (Z78 9)   · No advance directives (V49 89) (Z78 9)  The social history was reviewed and updated today        Current Meds   1  Allopurinol 100 MG Oral Tablet; Take 1 tablet daily; Therapy: 79GSO3551 to (Evaluate:47Tab7646)  Requested for: 41Hbb0296; Last   Rx:07Sep2017 Ordered   2  AmLODIPine Besylate 10 MG Oral Tablet; TAKE ONE TABLET BY MOUTH ONCE DAILY IN   THE MORNING; Therapy: 16PHY6663 to (Navajo Cure)  Requested for: 38Ani6477; Last   Rx:94Fex3290; Status: ACTIVE - Transmit to Pharmacy - Awaiting Verification Ordered   3  Aspirin Adult Low Dose 81 MG Oral Tablet Delayed Release; TAKE 1 TABLET DAILY; Therapy: (Recorded:16Mar2017) to Recorded   4  Atorvastatin Calcium 10 MG Oral Tablet; 1 Every Day At Bedtime; Therapy: (Recorded:11May2015) to Recorded   5  Cialis 20 MG Oral Tablet; TAKE 1 TABLET 1 HOUR BEFORE ACTIVITY AS NEEDED; Therapy: 73NTD5459 to (Last Rx:09Jan2017)  Requested for: 28RYO9832 Ordered   6  GlipiZIDE ER 5 MG Oral Tablet Extended Release 24 Hour; TAKE ONE TABLET BY   MOUTH TWICE DAILY FOR  90  DAYS; Therapy: 68Fsp8018 to (Raheem Cure)  Requested for: 39Nmy3795; Last   Rx:92Mta2861 Ordered   7  Levothyroxine Sodium 175 MCG Oral Tablet; TAKE ONE TABLET BY MOUTH ONCE DAILY   TAKE  ON  AN  EMPTY  STOMACH  WITH  FULL  GLASS    OF  WATER; Therapy: 51DQP5617 to (Mounika Samuel)  Requested for: 01Sep2017; Last   Rx:01Sep2017 Ordered   8  Lisinopril 40 MG Oral Tablet; TAKE ONE TABLET BY MOUTH ONCE DAILY; Therapy: 37Rqi7006 to (Navajo Cure)  Requested for: 78Vfm7186; Last   Rx:97Koi1183; Status: ACTIVE - Transmit to Pharmacy - Awaiting Verification Ordered   9  Meloxicam 15 MG Oral Tablet; TAKE 1 TABLET DAILY WITH FOOD; Therapy: 94ABB3768 to (Jeny Salinas)  Requested for: 42GAC9394; Last   Rx:73Axf3499 Ordered   10  MetFORMIN HCl - 1000 MG Oral Tablet; TAKE ONE TABLET BY MOUTH ONCE DAILY; Therapy: 50LUK5390 to (Navajo Cure)  Requested for: 49Nnj2904; Last    Rx:90Yva8383 Ordered   11   Metoprolol Succinate ER 25 MG Oral Tablet Extended Release 24 Hour; 1 every morning; Therapy: (Recorded:03Uiz9207) to Recorded   12  OneTouch Ultra Blue In [employer]; twice daily DX CODE 250 two times daily  Requested    for: 10Aug2017; Last Rx:10Aug2017 Ordered   13  Plavix 75 MG Oral Tablet; Take 1 tablet daily; Therapy: (Recorded:16Mar2017) to Recorded    The medication list was reviewed and updated today  Allergies    1  Sulindac TABS    Vitals  Signs    Systolic: 996  Diastolic: 78   Temperature: 97 6 F, Temporal  Heart Rate: 72, R PT  Pulse Quality: Normal, R PT  Respiration Quality: Normal  Respiration: 16  Systolic: 119, LUE, Sitting  Diastolic: 80, LUE, Sitting  Height: 6 ft   Weight: 247 lb   BMI Calculated: 33 5  BSA Calculated: 2 33  O2 Saturation: 98    Physical Exam    Constitutional   General appearance: No acute distress, well appearing and well nourished  Head and Face   Head and face: Normal     Eyes   Conjunctiva and lids: No erythema, swelling or discharge  Pupils and irises: Equal, round, reactive to light  Ears, Nose, Mouth, and Throat   External inspection of ears and nose: Normal     Otoscopic examination: Tympanic membranes translucent with normal light reflex  Canals patent without erythema  Nasal mucosa, septum, and turbinates: Normal without edema or erythema  Lips, teeth, and gums: Normal, good dentition  Oropharynx: Normal with no erythema, edema, exudate or lesions  Neck   Neck: Supple, symmetric, trachea midline, no masses  Thyroid: Normal, no thyromegaly  Pulmonary   Respiratory effort: No increased work of breathing or signs of respiratory distress  Auscultation of lungs: Clear to auscultation  Cardiovascular   Auscultation of heart: Normal rate and rhythm, normal S1 and S2, no murmurs  SOFT RICHARD AT THE BASE  Carotid pulses: 2+ bilaterally  NO BRUITS  Abdominal aorta: Normal     Femoral pulses: 2+ bilaterally  Pedal pulses: 2+ bilaterally      Peripheral vascular exam: Normal     Examination of extremities for edema and/or varicosities: Abnormal   MILD STASIS CHANGES  Abdomen   Abdomen: Non-tender, no masses  Liver and spleen: No hepatomegaly or splenomegaly  Lymphatic   Palpation of lymph nodes in neck: No lymphadenopathy  Palpation of lymph nodes in axillae: No lymphadenopathy  Palpation of lymph nodes in groin: No lymphadenopathy  Musculoskeletal   Gait and station: Normal     Inspection/palpation of digits and nails: Normal without clubbing or cyanosis  MILD DJD CHANGES  Inspection/palpation of joints, bones, and muscles: Normal   MILD DJD CHANGES  Range of motion: Normal     Stability: Normal     Muscle strength/tone: Normal     Skin   Skin and subcutaneous tissue: Normal without rashes or lesions  Palpation of skin and subcutaneous tissue: Normal turgor  Neurologic   Cranial nerves: Cranial nerves 2-12 intact  Cortical function: Normal mental status  Reflexes: 2+ and symmetric  Diabetic Foot Exam: Socks and shoes removed, Right Foot Findings: normal foot  The toes were swollen  The sensory exam showed diminished vibratory sensation at the level of the toes and diminished position sense at the level of the toes  Socks and Shoes removed, Left Foot Findings: normal foot  The toes were swollen  Monofilament Testing: diminished tactile sensation with monofilament testing throughout both feet  Vascular: Pulses: 2+ in the posterior tibialis and 2+ in the dorsalis pedis  Pulses: 1+ in the posterior tibialis and 1+ in the dorsalis pedis  Assign Risk Category: 2: Loss of protective sensation with or without weakness, deformity, callus, pre-ulcer, or history of ulceration  High risk     Psychiatric   Judgment and insight: Normal     Orientation to person, place and time: Normal     Mood and affect: Normal        Results/Data  *VB - Urinary Incontinence Screen (Dx Z13 89 Screen for UI) 61PRT3913 09:57AM Katina Gonzalezot     Test Name Result Flag Reference   Urinary Incontinence Assessment 17Oct2017         Assessment    1  Type 2 diabetes mellitus with other neurologic complication (538 70) (P42 42)   2  Hypertension (401 9) (I10)   3  Generalized osteoarthrosis, involving multiple sites (715 09) (M15 9)   4  Mitral and aortic valve disease (396 9) (I08 0)   5  Status post aortic valve replacement (V43 3) (Z95 2)   6  Adverse reaction to NSAIDs (E935 8) (T39 395A)   7  Pre-operative clearance (V72 84) (Z01 818)   8  Acquired hypothyroidism (244 9) (E03 9)   9  Obstructive sleep apnea (327 23) (G47 33)    Plan  Screening for genitourinary condition    · *VB - Urinary Incontinence Screen (Dx Z13 89 Screen for UI); Status:Complete -  Retrospective Authorization;   Done: 09BTZ7130 09:57AM    Discussion/Summary  Surgical Clearance: He is at a LOW TO MODERATE risk from a cardiovascular standpoint at this time without any additional cardiac testing  Reevaluation needed, if he should present with symptoms prior to surgery/procedure  THERE ARE NO OBVIOUS MEDICAL CONTRAINDICATIONS FOR SURGERY UNDER LOCAL ANESTHESIA WITH SEDATION  ANTIBIOTIC PROPHYLAXIS SHOULD BE GIVEN AS WELL AS DVT PROPHYLAXIS SHOULD BE CONSIDERED  The treatment plan was reviewed with the patient/guardian  The patient/guardian understands and agrees with the treatment plan      End of Encounter Meds    1  Meloxicam 15 MG Oral Tablet; TAKE 1 TABLET DAILY WITH FOOD; Therapy: 24TCN0622 to (Jac Leblanc)  Requested for: 93XKH6990; Last   Rx:85Mto3131 Ordered    2  GlipiZIDE ER 5 MG Oral Tablet Extended Release 24 Hour; TAKE ONE TABLET BY   MOUTH TWICE DAILY FOR  90  DAYS; Therapy: 01Zna3212 to (Tellis Nephew)  Requested for: 40Ugk5840; Last   Rx:34Swk7650 Ordered   3  MetFORMIN HCl - 1000 MG Oral Tablet; TAKE ONE TABLET BY MOUTH ONCE DAILY; Therapy: 44DEN0027 to (Tellis Nephew)  Requested for: 19Ckr4433; Last   Rx:94Xld5592 Ordered   4   OneTouch Ultra Blue In [employer]; twice daily DX CODE 250 two times daily  Requested   for: 10Aug2017; Last Rx:10Aug2017 Ordered    5  Cialis 20 MG Oral Tablet; TAKE 1 TABLET 1 HOUR BEFORE ACTIVITY AS NEEDED; Therapy: 50RZO9932 to (Last Rx:09Jan2017)  Requested for: 96NXD2524 Ordered    6  Allopurinol 100 MG Oral Tablet; Take 1 tablet daily; Therapy: 72ZKH8909 to (Evaluate:92Icd5418)  Requested for: 60Bxi9270; Last   LW:03WRC4920 Ordered    7  AmLODIPine Besylate 10 MG Oral Tablet; TAKE ONE TABLET BY MOUTH ONCE DAILY IN   THE MORNING; Therapy: 36XGC3300 to (Jean Pierre Hensley)  Requested for: 34Udr4768; Last   Rx:58Add0778; Status: ACTIVE - Transmit to Pharmacy - Awaiting Verification Ordered   8  Lisinopril 40 MG Oral Tablet; TAKE ONE TABLET BY MOUTH ONCE DAILY; Therapy: 90Vuk1624 to (Jean Pierre Hensley)  Requested for: 71Ape2572; Last   Rx:41Ixc0027; Status: ACTIVE - Transmit to Pharmacy - Awaiting Verification Ordered    9  Levothyroxine Sodium 175 MCG Oral Tablet; TAKE ONE TABLET BY MOUTH ONCE DAILY   TAKE  ON  AN  EMPTY  STOMACH  WITH  FULL  GLASS    OF  WATER; Therapy: 53OUR0806 to (Lizette Callahan)  Requested for: 07Mol8663; Last   Rx:44Aou8743 Ordered    10  Aspirin Adult Low Dose 81 MG Oral Tablet Delayed Release; TAKE 1 TABLET DAILY; Therapy: (Recorded:16Mar2017) to Recorded   11  Atorvastatin Calcium 10 MG Oral Tablet; 1 Every Day At Bedtime; Therapy: (Recorded:54Idu6172) to Recorded   12  Metoprolol Succinate ER 25 MG Oral Tablet Extended Release 24 Hour; 1 every morning; Therapy: (Recorded:08Zeh2084) to Recorded   13  Plavix 75 MG Oral Tablet (Clopidogrel Bisulfate); Take 1 tablet daily;     Therapy: (Recorded:16Mar2017) to Recorded    Signatures   Electronically signed by : Marge Peralta MD; Oct 17 2017 10:26AM EST                       (Author)

## 2018-02-08 PROBLEM — M15.9 GENERALIZED OSTEOARTHRITIS OF MULTIPLE SITES: Status: ACTIVE | Noted: 2017-03-16

## 2018-02-08 PROBLEM — T39.395A ADVERSE REACTION TO NSAIDS: Status: ACTIVE | Noted: 2017-04-10

## 2018-02-08 PROBLEM — M25.50 ARTHRALGIA OF MULTIPLE SITES: Status: ACTIVE | Noted: 2017-04-05

## 2018-02-12 DIAGNOSIS — E03.9 ACQUIRED HYPOTHYROIDISM: ICD-10-CM

## 2018-02-12 DIAGNOSIS — I10 ESSENTIAL HYPERTENSION: Primary | ICD-10-CM

## 2018-02-12 DIAGNOSIS — M15.9 PRIMARY OSTEOARTHRITIS INVOLVING MULTIPLE JOINTS: ICD-10-CM

## 2018-02-12 DIAGNOSIS — E11.9 TYPE 2 DIABETES MELLITUS WITHOUT COMPLICATION, WITHOUT LONG-TERM CURRENT USE OF INSULIN (HCC): ICD-10-CM

## 2018-02-12 RX ORDER — AMLODIPINE BESYLATE 10 MG/1
10 TABLET ORAL DAILY
Qty: 90 TABLET | Refills: 3 | Status: SHIPPED | OUTPATIENT
Start: 2018-02-12 | End: 2019-03-04 | Stop reason: SDUPTHER

## 2018-02-12 RX ORDER — LISINOPRIL 40 MG/1
40 TABLET ORAL DAILY
Qty: 90 TABLET | Refills: 3 | Status: SHIPPED | OUTPATIENT
Start: 2018-02-12 | End: 2019-03-04 | Stop reason: SDUPTHER

## 2018-02-12 RX ORDER — MELOXICAM 15 MG/1
15 TABLET ORAL DAILY
Qty: 90 TABLET | Refills: 3 | Status: SHIPPED | OUTPATIENT
Start: 2018-02-12 | End: 2019-03-04 | Stop reason: SDUPTHER

## 2018-02-12 RX ORDER — LEVOTHYROXINE SODIUM 175 UG/1
175 TABLET ORAL DAILY
Qty: 90 TABLET | Refills: 3 | Status: SHIPPED | OUTPATIENT
Start: 2018-02-12 | End: 2019-03-04 | Stop reason: SDUPTHER

## 2018-04-18 DIAGNOSIS — M10.09 IDIOPATHIC GOUT OF MULTIPLE SITES, UNSPECIFIED CHRONICITY: Primary | ICD-10-CM

## 2018-04-18 RX ORDER — ALLOPURINOL 100 MG/1
100 TABLET ORAL DAILY
Qty: 90 TABLET | Refills: 3 | Status: SHIPPED | OUTPATIENT
Start: 2018-04-18 | End: 2019-04-19 | Stop reason: SDUPTHER

## 2018-08-21 ENCOUNTER — TELEPHONE (OUTPATIENT)
Dept: FAMILY MEDICINE CLINIC | Facility: CLINIC | Age: 68
End: 2018-08-21

## 2018-09-28 ENCOUNTER — OFFICE VISIT (OUTPATIENT)
Dept: FAMILY MEDICINE CLINIC | Facility: CLINIC | Age: 68
End: 2018-09-28
Payer: MEDICARE

## 2018-09-28 VITALS
TEMPERATURE: 97.5 F | SYSTOLIC BLOOD PRESSURE: 158 MMHG | HEART RATE: 82 BPM | OXYGEN SATURATION: 98 % | DIASTOLIC BLOOD PRESSURE: 80 MMHG | WEIGHT: 248 LBS | HEIGHT: 71 IN | RESPIRATION RATE: 20 BRPM | BODY MASS INDEX: 34.72 KG/M2

## 2018-09-28 DIAGNOSIS — Z12.5 ENCOUNTER FOR PROSTATE CANCER SCREENING: ICD-10-CM

## 2018-09-28 DIAGNOSIS — E03.9 ACQUIRED HYPOTHYROIDISM: ICD-10-CM

## 2018-09-28 DIAGNOSIS — I08.0 MITRAL AND AORTIC VALVE DISEASE: ICD-10-CM

## 2018-09-28 DIAGNOSIS — M1A.09X0 IDIOPATHIC CHRONIC GOUT OF MULTIPLE SITES WITHOUT TOPHUS: ICD-10-CM

## 2018-09-28 DIAGNOSIS — G89.29 CHRONIC MIDLINE LOW BACK PAIN WITHOUT SCIATICA: ICD-10-CM

## 2018-09-28 DIAGNOSIS — M54.50 CHRONIC MIDLINE LOW BACK PAIN WITHOUT SCIATICA: ICD-10-CM

## 2018-09-28 DIAGNOSIS — M15.9 GENERALIZED OSTEOARTHRITIS OF MULTIPLE SITES: ICD-10-CM

## 2018-09-28 DIAGNOSIS — G47.33 OBSTRUCTIVE SLEEP APNEA: ICD-10-CM

## 2018-09-28 DIAGNOSIS — I25.10 CORONARY ARTERY DISEASE INVOLVING NATIVE HEART WITHOUT ANGINA PECTORIS, UNSPECIFIED VESSEL OR LESION TYPE: ICD-10-CM

## 2018-09-28 DIAGNOSIS — Z12.11 COLON CANCER SCREENING: ICD-10-CM

## 2018-09-28 DIAGNOSIS — E11.9 TYPE 2 DIABETES MELLITUS WITHOUT COMPLICATION, WITHOUT LONG-TERM CURRENT USE OF INSULIN (HCC): Primary | ICD-10-CM

## 2018-09-28 DIAGNOSIS — Z95.2 HISTORY OF AORTIC VALVE REPLACEMENT: ICD-10-CM

## 2018-09-28 DIAGNOSIS — I10 ESSENTIAL HYPERTENSION: ICD-10-CM

## 2018-09-28 LAB
ALBUMIN SERPL BCP-MCNC: 4.3 G/DL (ref 3.5–5)
ALP SERPL-CCNC: 66 U/L (ref 46–116)
ALT SERPL W P-5'-P-CCNC: 22 U/L (ref 12–78)
ANION GAP SERPL CALCULATED.3IONS-SCNC: 7 MMOL/L (ref 4–13)
AST SERPL W P-5'-P-CCNC: 15 U/L (ref 5–45)
BASOPHILS # BLD AUTO: 0.07 THOUSANDS/ΜL (ref 0–0.1)
BASOPHILS NFR BLD AUTO: 1 % (ref 0–1)
BILIRUB SERPL-MCNC: 0.74 MG/DL (ref 0.2–1)
BUN SERPL-MCNC: 19 MG/DL (ref 5–25)
CALCIUM SERPL-MCNC: 9.5 MG/DL (ref 8.3–10.1)
CHLORIDE SERPL-SCNC: 97 MMOL/L (ref 100–108)
CHOLEST SERPL-MCNC: 176 MG/DL (ref 50–200)
CO2 SERPL-SCNC: 27 MMOL/L (ref 21–32)
CREAT SERPL-MCNC: 0.86 MG/DL (ref 0.6–1.3)
CREAT UR-MCNC: 56.1 MG/DL
ECG INTERP DURING EX: NORMAL MS
EOSINOPHIL # BLD AUTO: 0.11 THOUSAND/ΜL (ref 0–0.61)
EOSINOPHIL NFR BLD AUTO: 2 % (ref 0–6)
ERYTHROCYTE [DISTWIDTH] IN BLOOD BY AUTOMATED COUNT: 12.4 % (ref 11.6–15.1)
GFR SERPL CREATININE-BSD FRML MDRD: 88 ML/MIN/1.73SQ M
GLUCOSE P FAST SERPL-MCNC: 135 MG/DL (ref 65–99)
HCT VFR BLD AUTO: 41 % (ref 36.5–49.3)
HDLC SERPL-MCNC: 64 MG/DL (ref 40–60)
HGB BLD-MCNC: 13.8 G/DL (ref 12–17)
IMM GRANULOCYTES # BLD AUTO: 0.02 THOUSAND/UL (ref 0–0.2)
IMM GRANULOCYTES NFR BLD AUTO: 0 % (ref 0–2)
LDLC SERPL CALC-MCNC: 95 MG/DL (ref 0–100)
LYMPHOCYTES # BLD AUTO: 1.97 THOUSANDS/ΜL (ref 0.6–4.47)
LYMPHOCYTES NFR BLD AUTO: 28 % (ref 14–44)
MCH RBC QN AUTO: 34.6 PG (ref 26.8–34.3)
MCHC RBC AUTO-ENTMCNC: 33.7 G/DL (ref 31.4–37.4)
MCV RBC AUTO: 103 FL (ref 82–98)
MICROALBUMIN UR-MCNC: 6.9 MG/L (ref 0–20)
MICROALBUMIN/CREAT 24H UR: 12 MG/G CREATININE (ref 0–30)
MONOCYTES # BLD AUTO: 0.66 THOUSAND/ΜL (ref 0.17–1.22)
MONOCYTES NFR BLD AUTO: 9 % (ref 4–12)
NEUTROPHILS # BLD AUTO: 4.28 THOUSANDS/ΜL (ref 1.85–7.62)
NEUTS SEG NFR BLD AUTO: 60 % (ref 43–75)
NONHDLC SERPL-MCNC: 112 MG/DL
NRBC BLD AUTO-RTO: 0 /100 WBCS
PLATELET # BLD AUTO: 292 THOUSANDS/UL (ref 149–390)
PMV BLD AUTO: 9.7 FL (ref 8.9–12.7)
POTASSIUM SERPL-SCNC: 4.6 MMOL/L (ref 3.5–5.3)
PROT SERPL-MCNC: 7.7 G/DL (ref 6.4–8.2)
PSA SERPL-MCNC: 1.2 NG/ML (ref 0–4)
RBC # BLD AUTO: 3.99 MILLION/UL (ref 3.88–5.62)
SL AMB  POCT GLUCOSE, UA: 0
SL AMB LEUKOCYTE ESTERASE,UA: 0
SL AMB POCT BILIRUBIN,UA: 0
SL AMB POCT BLOOD,UA: 0
SL AMB POCT CLARITY,UA: CLEAR
SL AMB POCT COLOR,UA: YELLOW
SL AMB POCT FECES OCC BLD: NORMAL
SL AMB POCT HEMOGLOBIN AIC: 5.6
SL AMB POCT KETONES,UA: 0
SL AMB POCT NITRITE,UA: 0
SL AMB POCT PH,UA: 5
SL AMB POCT SPECIFIC GRAVITY,UA: 1.01
SL AMB POCT URINE PROTEIN: 0
SL AMB POCT UROBILINOGEN: 0
SODIUM SERPL-SCNC: 131 MMOL/L (ref 136–145)
TRIGL SERPL-MCNC: 87 MG/DL
TSH SERPL DL<=0.05 MIU/L-ACNC: 1.02 UIU/ML (ref 0.36–3.74)
URATE SERPL-MCNC: 5.3 MG/DL (ref 4.2–8)
WBC # BLD AUTO: 7.11 THOUSAND/UL (ref 4.31–10.16)

## 2018-09-28 PROCEDURE — 80061 LIPID PANEL: CPT | Performed by: FAMILY MEDICINE

## 2018-09-28 PROCEDURE — 85025 COMPLETE CBC W/AUTO DIFF WBC: CPT | Performed by: FAMILY MEDICINE

## 2018-09-28 PROCEDURE — 82043 UR ALBUMIN QUANTITATIVE: CPT | Performed by: FAMILY MEDICINE

## 2018-09-28 PROCEDURE — 84550 ASSAY OF BLOOD/URIC ACID: CPT | Performed by: FAMILY MEDICINE

## 2018-09-28 PROCEDURE — G0439 PPPS, SUBSEQ VISIT: HCPCS | Performed by: FAMILY MEDICINE

## 2018-09-28 PROCEDURE — 84443 ASSAY THYROID STIM HORMONE: CPT | Performed by: FAMILY MEDICINE

## 2018-09-28 PROCEDURE — 82570 ASSAY OF URINE CREATININE: CPT | Performed by: FAMILY MEDICINE

## 2018-09-28 PROCEDURE — 81003 URINALYSIS AUTO W/O SCOPE: CPT | Performed by: FAMILY MEDICINE

## 2018-09-28 PROCEDURE — 93000 ELECTROCARDIOGRAM COMPLETE: CPT | Performed by: FAMILY MEDICINE

## 2018-09-28 PROCEDURE — 99214 OFFICE O/P EST MOD 30 MIN: CPT | Performed by: FAMILY MEDICINE

## 2018-09-28 PROCEDURE — G0103 PSA SCREENING: HCPCS | Performed by: FAMILY MEDICINE

## 2018-09-28 PROCEDURE — 82270 OCCULT BLOOD FECES: CPT | Performed by: FAMILY MEDICINE

## 2018-09-28 PROCEDURE — 80053 COMPREHEN METABOLIC PANEL: CPT | Performed by: FAMILY MEDICINE

## 2018-09-28 PROCEDURE — 83036 HEMOGLOBIN GLYCOSYLATED A1C: CPT | Performed by: FAMILY MEDICINE

## 2018-09-28 PROCEDURE — 36415 COLL VENOUS BLD VENIPUNCTURE: CPT | Performed by: FAMILY MEDICINE

## 2018-09-28 RX ORDER — TRAMADOL HYDROCHLORIDE 50 MG/1
50 TABLET ORAL EVERY 6 HOURS PRN
Qty: 30 TABLET | Refills: 0 | Status: SHIPPED | OUTPATIENT
Start: 2018-09-28

## 2018-09-28 RX ORDER — AMOXICILLIN 500 MG/1
CAPSULE ORAL
Refills: 0 | COMMUNITY
Start: 2018-09-10

## 2018-09-28 NOTE — PROGRESS NOTES
Assessment and Plan:    Problem List Items Addressed This Visit     Acquired hypothyroidism     STABLE  BW DUE         Relevant Orders    CBC and differential    Lipid panel    TSH, 3rd generation with Free T4 reflex    Generalized osteoarthritis of multiple sites     STABLE  DENIES ANY JOINT SWELLING OR REDNESS  JOINT STIFFNESS PRESENT  PAIN MANAGEMENT ADEQUATE    - CONTINUE CURRENT MANAGEMENT  - MEDICATION AS DIRECTED  - CALL / RETURN IF SYMPTOMS WORSEN           Gout     STABLE  NO RECENT EVENTS  COMPLIANT WITH MEDS  WATCHING DIET         Relevant Orders    Uric acid    Hypertension     STABLE  DENIES ANY CP, SOB, PALPITATIONS, OR HEADACHE  NOTES NO WATER RETENTION  COMPLIANT WITH MEDICATION  NO CONCERNS    - CONTINUE CURRENT TREATMENT PLAN  - MONITOR DIETARY SODIUM INTAKE  - ENCOURAGE PHYSICAL ACTIVITY  - RV 3 MONTHS           Relevant Orders    POCT ECG (Completed)    POCT urine dip auto non-scope (Completed)    Comprehensive metabolic panel    Uric acid    Mitral and aortic valve disease    Relevant Orders    CBC and differential    Obstructive sleep apnea    Type 2 diabetes mellitus ([facility] [address] [address] ) - Primary     Lab Results   Component Value Date    HGBA1C 5 1 10/17/2017       No results for input(s): POCGLU in the last 72 hours      Blood Sugar Average: Last 72 hrs:   COMPLIANT WITH MEDICATION  DENIES ANY NUMBNESS, TINGLING IN FEET    - DISCUSSED DIETARY MODIFICATION OF CARBOHYDRATES  - HGB A1C AND URINE STUDIES DUE TODAY  - FOOT CARE  - RV 3 MONTHS             Relevant Orders    CBC and differential    Lipid panel    Microalbumin / creatinine urine ratio    POCT hemoglobin A1c (Completed)    Colon cancer screening    Relevant Orders    POCT hemoccult screening (Completed)    Encounter for prostate cancer screening    Relevant Orders    PSA, Total Screen    Chronic midline low back pain without sciatica    Relevant Medications    traMADol (ULTRAM) 50 mg tablet    Other Relevant Orders    Ambulatory referral to Physical Therapy    Coronary artery disease involving native heart without angina pectoris    Relevant Orders    Lipid panel    History of aortic valve replacement    Relevant Orders    CBC and differential        Health Maintenance Due   Topic Date Due    Depression Screening PHQ  1950    CRC Screening: Colonoscopy  1950    DTaP,Tdap,and Td Vaccines (1 - Tdap) 04/11/1969    Fall Risk  04/11/2013    DM Eye Exam  08/19/2016    Pneumococcal PPSV23/PCV13 65+ Years / Low and Medium Risk (2 of 2 - PPSV23) 05/10/2017    HEMOGLOBIN A1C  04/17/2018    Diabetic Foot Exam  08/10/2018         HPI:  Bridger Mitchell is a 77 y o  male here for his Subsequent Wellness Visit      Patient Active Problem List   Diagnosis    Acquired hypothyroidism    Adverse reaction to NSAIDs    Arthralgia of multiple sites    Erectile dysfunction    Generalized osteoarthritis of multiple sites    Gout    Hearing loss    Hypertension    Mitral and aortic valve disease    Obstructive sleep apnea    Type 2 diabetes mellitus (HCC)    Colon cancer screening    Encounter for prostate cancer screening    Chronic midline low back pain without sciatica    Coronary artery disease involving native heart without angina pectoris    History of aortic valve replacement     Past Medical History:   Diagnosis Date    Chronic left shoulder pain     Last assessed 5/11/2017    Congenital stenosis of aortic valve     Last assessed: 5/10/2016    Hyperthyroidism     Last Assessed: 10/17/2017     Past Surgical History:   Procedure Laterality Date    AORTIC VALVE REPLACEMENT  10/05/2015    Dr Jean Elkins, Dr Patito Garcia  01/2014    inguinal- Dr Sushil Rajput 2013     Family History   Problem Relation Age of Onset    Heart disease Father     Hypertension Father     Mental illness Family     Substance Abuse Neg Hx      History   Smoking Status    Never Smoker   Smokeless Tobacco    Never Used     History   Alcohol Use    Yes     Comment: minimum alcohol consumption       History   Drug Use No       Current Outpatient Prescriptions   Medication Sig Dispense Refill    allopurinol (ZYLOPRIM) 100 mg tablet Take 1 tablet (100 mg total) by mouth daily 90 tablet 3    amLODIPine (NORVASC) 10 mg tablet Take 1 tablet (10 mg total) by mouth daily 90 tablet 3    aspirin (ASPIRIN ADULT LOW DOSE) 81 mg EC tablet Take 1 tablet by mouth daily      atorvastatin (LIPITOR) 10 mg tablet Take by mouth      clopidogrel (PLAVIX) 75 mg tablet Take 1 tablet by mouth daily      levothyroxine 175 mcg tablet Take 1 tablet (175 mcg total) by mouth daily 90 tablet 3    lisinopril (ZESTRIL) 40 mg tablet Take 1 tablet (40 mg total) by mouth daily 90 tablet 3    meloxicam (MOBIC) 15 mg tablet Take 1 tablet (15 mg total) by mouth daily 90 tablet 3    metFORMIN (GLUCOPHAGE) 1000 MG tablet Take 1 tablet (1,000 mg total) by mouth daily 90 tablet 3    metoprolol succinate (TOPROL-XL) 25 mg 24 hr tablet Take by mouth      ONE TOUCH ULTRA TEST test strip TEST twice a day  0    tadalafil (CIALIS) 20 MG tablet Take 1 tablet by mouth      amoxicillin (AMOXIL) 500 mg capsule take 4 capsules by mouth 1 hour prior to appointment  0    glipiZIDE (GLUCOTROL XL) 5 mg 24 hr tablet Take by mouth      traMADol (ULTRAM) 50 mg tablet Take 1 tablet (50 mg total) by mouth every 6 (six) hours as needed for moderate pain 30 tablet 0     No current facility-administered medications for this visit  Allergies   Allergen Reactions    Sulindac      Immunization History   Administered Date(s) Administered    Pneumococcal Conjugate 13-Valent 05/10/2016       Patient Care Team:  Jennifer Pino MD as PCP - General  Kel Chan MD as Cardiologist    Medicare Screening Tests and Risk Assessments:  Gonzalez Garcia is here for his Subsequent Wellness visit    Last Medicare Wellness visit information reviewed, patient interviewed and updates made to the record today  Health Risk Assessment:  Patient rates overall health as very good  Patient feels that their physical health rating is Same  Eyesight was rated as Same  Hearing was rated as Same  Patient feels that their emotional and mental health rating is Same  Pain experienced by patient in the last 7 days has been Some  Patient's pain rating has been 6/10  Emotional/Mental Health:  Patient has been feeling nervous/anxious  PHQ-9 Depression Screening:    Frequency of the following problems over the past two weeks:      1  Little interest or pleasure in doing things: 0 - not at all      2  Feeling down, depressed, or hopeless: 0 - not at all  PHQ-2 Score: 0          Broken Bones/Falls: Fall Risk Assessment:    In the past year, patient has experienced: No history of falling in past year          Bladder/Bowel:  Patient has not leaked urine accidently in the last six months  Patient reports no loss of bowel control  Immunizations:  Patient has not had a flu vaccination within the last year  Patient has received a pneumonia shot  Patient has not received a shingles shot  Patient has received tetanus/diphtheria shot  Home Safety:  Patient does not have trouble with stairs inside or outside of their home  Patient currently reports that there are no safety hazards present in home, working smoke alarms, working carbon monoxide detectors  Preventative Screenings:   prostate cancer screen performed, 9/28/2018  colon cancer screen completed, 10/15/2017  cholesterol screen completed, 10/15/2017  glaucoma eye exam completed, 5/15/2018      Nutrition:  Current diet: Diabetic, Low Cholesterol, Low Saturated Fat, Low Carb and Limited junk food with servings of the following:    Medications:  Patient is currently taking over-the-counter supplements  Patient is able to manage medications  Lifestyle Choices:  Patient reports no tobacco use    Patient has not smoked or used tobacco in the past  Patient reports alcohol use  Alcohol use per week: ten to twelve drinks per week  Patient drives a vehicle  Patient wears seat belt  Current level of exercise of physical activity described by patient as: not enough because of back pain  Activities of Daily Living:  Can get out of bed by his or her self, able to dress self, able to make own meals, able to do own shopping, able to bathe self, can do own laundry/housekeeping, can manage own money, pay bills and track expenses    Previous Hospitalizations:  No hospitalization or ED visit in past 12 months        Advanced Directives:  Patient has decided on a power of   Patient has spoken to designated power of   Patient has not completed advanced directive  Preventative Screening/Counseling:      Cardiovascular:      General: Risks and Benefits Discussed and Screening Current      Counseling: Healthy Diet, Healthy Weight and Improve Cholesterol     Due for Labs/Analytes/Optional EKG: Lipid Panel and Cholesterol          Diabetes:      General: Risks and Benefits Discussed and Screening Current      Counseling: Healthy Diet and Healthy Weight      Due for labs: Blood Glucose          Colorectal Cancer:      General: Risks and Benefits Discussed and Screening Current          Prostate Cancer:      General: Risks and Benefits Discussed and Screening Current      Due for labs: PSA          Osteoporosis:      General: Screening Not Indicated          AAA:      General: Screening Not Indicated          Glaucoma:      General: Screening Current          HIV:      General: Screening Not Indicated          Hepatitis C:      General: Screening Not Indicated        Advanced Directives:   Patient has living will for healthcare, does not have durable POA for healthcare, patient does not have an advanced directive  Information on ACP and/or AD not provided  No 5 wishes given  End of life assessment reviewed with patient

## 2018-09-28 NOTE — PROGRESS NOTES
[facility] HEALTH MAINTENANCE OFFICE VISIT  [facility]    NAME: Tenzin Colindres  AGE: 77 y o  SEX: male  : 1950     DATE: 2018    Assessment and Plan     Problem List Items Addressed This Visit        Endocrine    Acquired hypothyroidism     STABLE  BW DUE         Relevant Orders    CBC and differential    Lipid panel    TSH, 3rd generation with Free T4 reflex    Type 2 diabetes mellitus ([address] ) - Primary     Lab Results   Component Value Date    HGBA1C 5 1 10/17/2017       No results for input(s): POCGLU in the last 72 hours      Blood Sugar Average: Last 72 hrs:   COMPLIANT WITH MEDICATION  DENIES ANY NUMBNESS, TINGLING IN FEET    - DISCUSSED DIETARY MODIFICATION OF CARBOHYDRATES  - HGB A1C AND URINE STUDIES DUE TODAY  - FOOT CARE  - RV 3 MONTHS             Relevant Orders    CBC and differential    Lipid panel    Microalbumin / creatinine urine ratio    POCT hemoglobin A1c (Completed)       Respiratory    Obstructive sleep apnea       Cardiovascular and Mediastinum    Hypertension     STABLE  DENIES ANY CP, SOB, PALPITATIONS, OR HEADACHE  NOTES NO WATER RETENTION  COMPLIANT WITH MEDICATION  NO CONCERNS    - CONTINUE CURRENT TREATMENT PLAN  - MONITOR DIETARY SODIUM INTAKE  - ENCOURAGE PHYSICAL ACTIVITY  - RV 3 MONTHS           Relevant Orders    POCT ECG (Completed)    POCT urine dip auto non-scope (Completed)    Comprehensive metabolic panel    Uric acid    Mitral and aortic valve disease    Relevant Orders    CBC and differential    Coronary artery disease involving native heart without angina pectoris    Relevant Orders    Lipid panel       Musculoskeletal and Integument    Generalized osteoarthritis of multiple sites     STABLE  DENIES ANY JOINT SWELLING OR REDNESS  JOINT STIFFNESS PRESENT  PAIN MANAGEMENT ADEQUATE    - CONTINUE CURRENT MANAGEMENT  - MEDICATION AS DIRECTED  - CALL / RETURN IF SYMPTOMS WORSEN              Other    Gout STABLE  NO RECENT EVENTS  COMPLIANT WITH MEDS  WATCHING DIET         Relevant Orders    Uric acid    Colon cancer screening    Relevant Orders    POCT hemoccult screening (Completed)    Encounter for prostate cancer screening    Relevant Orders    PSA, Total Screen    Chronic midline low back pain without sciatica    Relevant Medications    traMADol (ULTRAM) 50 mg tablet    Other Relevant Orders    Ambulatory referral to Physical Therapy    History of aortic valve replacement    Relevant Orders    CBC and differential               Return in about 3 months (around 12/28/2018) for Recheck  Chief Complaint     Chief Complaint   Patient presents with    Medicare Wellness Visit       History of Present Illness     PATIENT RETURNS FOR ANNUAL WELLNESS EXAM AND ANNUAL EVALUATION OF PATIENT'S MEDICAL ISSUES    INDIVIDUAL MEDICAL ISSUES WITH THEIR CURRENT STATUS, ASSESSMENT AND PLANS ARE LISTED ABOVE            Well Adult Physical   Patient here for a comprehensive physical exam       Diet and Physical Activity  Diet: well balanced diet  Weight concerns: Patient has class 1 obesity (BMI 30-34  9)  Exercise: occasionally      Depression Screen  PHQ-9 Depression Screening    PHQ-9:    Frequency of the following problems over the past two weeks:               General Health  Hearing: Normal:  bilateral  Vision: no vision problems  Dental: regular dental visits    Reproductive Health          The following portions of the patient's history were reviewed and updated as appropriate: allergies, current medications, past family history, past medical history, past social history, past surgical history and problem list     Review of Systems     Review of Systems   Constitutional: Negative for chills, fatigue and fever  HENT: Negative for congestion, ear discharge, ear pain, mouth sores, postnasal drip, sore throat and trouble swallowing  Eyes: Negative for pain, discharge and visual disturbance     Respiratory: Negative for cough, shortness of breath and wheezing  Cardiovascular: Negative for chest pain, palpitations and leg swelling  Gastrointestinal: Negative for abdominal distention, abdominal pain, blood in stool, diarrhea and nausea  Endocrine: Negative for polydipsia, polyphagia and polyuria  Genitourinary: Negative for dysuria, frequency, hematuria and urgency  Musculoskeletal: Negative for arthralgias, gait problem and joint swelling  Skin: Negative for pallor and rash  Neurological: Negative for dizziness, syncope, speech difficulty, weakness, light-headedness, numbness and headaches  Hematological: Negative for adenopathy  Psychiatric/Behavioral: Negative for behavioral problems, confusion and sleep disturbance  The patient is not nervous/anxious          Past Medical History     Past Medical History:   Diagnosis Date    Chronic left shoulder pain     Last assessed 5/11/2017    Congenital stenosis of aortic valve     Last assessed: 5/10/2016    Hyperthyroidism     Last Assessed: 10/17/2017       Past Surgical History     Past Surgical History:   Procedure Laterality Date    AORTIC VALVE REPLACEMENT  10/05/2015    Dr Genesis Miller, [facility]  01/2014    inguinal- Dr Zack Goel 2013       Social History     Social History     Social History    Marital status: /Civil Union     Spouse name: N/A    Number of children: N/A    Years of education: N/A     Social History Main Topics    Smoking status: Never Smoker    Smokeless tobacco: Never Used    Alcohol use Yes      Comment: minimum alcohol consumption     Drug use: No    Sexual activity: Not Asked     Other Topics Concern    None     Social History Narrative    Per [employer]:     Caffeine use     Dental care, regularly     Drinks Coffee     No advance directives        Family History     Family History   Problem Relation Age of Onset    Heart disease Father     Hypertension Father  Mental illness Family     Substance Abuse Neg Hx        Current Medications       Current Outpatient Prescriptions:     allopurinol (ZYLOPRIM) 100 mg tablet, Take 1 tablet (100 mg total) by mouth daily, Disp: 90 tablet, Rfl: 3    amLODIPine (NORVASC) 10 mg tablet, Take 1 tablet (10 mg total) by mouth daily, Disp: 90 tablet, Rfl: 3    aspirin (ASPIRIN ADULT LOW DOSE) 81 mg EC tablet, Take 1 tablet by mouth daily, Disp: , Rfl:     atorvastatin (LIPITOR) 10 mg tablet, Take by mouth, Disp: , Rfl:     clopidogrel (PLAVIX) 75 mg tablet, Take 1 tablet by mouth daily, Disp: , Rfl:     levothyroxine 175 mcg tablet, Take 1 tablet (175 mcg total) by mouth daily, Disp: 90 tablet, Rfl: 3    lisinopril (ZESTRIL) 40 mg tablet, Take 1 tablet (40 mg total) by mouth daily, Disp: 90 tablet, Rfl: 3    meloxicam (MOBIC) 15 mg tablet, Take 1 tablet (15 mg total) by mouth daily, Disp: 90 tablet, Rfl: 3    metFORMIN (GLUCOPHAGE) 1000 MG tablet, Take 1 tablet (1,000 mg total) by mouth daily, Disp: 90 tablet, Rfl: 3    metoprolol succinate (TOPROL-XL) 25 mg 24 hr tablet, Take by mouth, Disp: , Rfl:     ONE TOUCH ULTRA TEST test strip, TEST twice a day, Disp: , Rfl: 0    tadalafil (CIALIS) 20 MG tablet, Take 1 tablet by mouth, Disp: , Rfl:     amoxicillin (AMOXIL) 500 mg capsule, take 4 capsules by mouth 1 hour prior to appointment, Disp: , Rfl: 0    glipiZIDE (GLUCOTROL XL) 5 mg 24 hr tablet, Take by mouth, Disp: , Rfl:     traMADol (ULTRAM) 50 mg tablet, Take 1 tablet (50 mg total) by mouth every 6 (six) hours as needed for moderate pain, Disp: 30 tablet, Rfl: 0     Allergies     Allergies   Allergen Reactions    Sulindac        Objective     /80   Pulse 82   Temp 97 5 °F (36 4 °C) (Temporal)   Resp 20   Ht 5' 11" (1 803 m)   Wt 112 kg (248 lb)   SpO2 98%   BMI 34 59 kg/m²      Physical Exam   Constitutional: He is oriented to person, place, and time  He appears well-developed and well-nourished     HENT: Head: Normocephalic and atraumatic  Right Ear: External ear normal    Left Ear: External ear normal    Nose: Nose normal    Mouth/Throat: Oropharynx is clear and moist    Eyes: Pupils are equal, round, and reactive to light  Conjunctivae and EOM are normal  Right eye exhibits no discharge  Left eye exhibits no discharge  Neck: Neck supple  No JVD present  No thyromegaly present  Cardiovascular: Normal rate, regular rhythm, normal heart sounds and intact distal pulses  Pulses are no weak pulses  No murmur heard  Pulses:       Dorsalis pedis pulses are 1+ on the right side, and 1+ on the left side  Posterior tibial pulses are 1+ on the right side, and 1+ on the left side  Pulmonary/Chest: Effort normal and breath sounds normal  He has no wheezes  He has no rales  Abdominal: Soft  Bowel sounds are normal  He exhibits no mass  There is no hepatosplenomegaly  There is no tenderness  There is no rebound, no guarding and no CVA tenderness  Genitourinary: Rectum normal, prostate normal and penis normal  Rectal exam shows guaiac negative stool  Musculoskeletal: Normal range of motion  He exhibits no edema, tenderness or deformity  Feet:   Right Foot:   Skin Integrity: Negative for ulcer, skin breakdown, erythema, warmth, callus or dry skin  Left Foot:   Skin Integrity: Negative for ulcer, skin breakdown, erythema, warmth, callus or dry skin  Lymphadenopathy:     He has no cervical adenopathy  He has no axillary adenopathy  Neurological: He is alert and oriented to person, place, and time  He has normal reflexes  No cranial nerve deficit  He exhibits normal muscle tone  Coordination normal    Skin: Skin is warm and dry  No rash noted  No erythema  Psychiatric: He has a normal mood and affect  His behavior is normal  Judgment and thought content normal        Patient's shoes and socks removed  Right Foot/Ankle   Right Foot Inspection  Skin Exam: skin normal and skin intact no dry skin, no warmth, no callus, no erythema, no maceration, no abnormal color, no pre-ulcer, no ulcer and no callus                          Toe Exam: ROM and strength within normal limits  Sensory       Monofilament testing: diminished  Vascular    The right DP pulse is 1+  The right PT pulse is 1+  Left Foot/Ankle  Left Foot Inspection  Skin Exam: skin normal and skin intactno dry skin, no warmth, no erythema, no maceration, normal color, no pre-ulcer, no ulcer and no callus                         Toe Exam: ROM and strength within normal limits                   Sensory       Monofilament: diminished  Vascular    The left DP pulse is 1+  The left PT pulse is 1+  Assign Risk Category:  No deformity present; Loss of protective sensation;  No weak pulses       Risk: 1      No exam data present    Health Maintenance     Health Maintenance   Topic Date Due    Depression Screening PHQ  1950    CRC Screening: Colonoscopy  1950    DTaP,Tdap,and Td Vaccines (1 - Tdap) 04/11/1969    Fall Risk  04/11/2013    DM Eye Exam  08/19/2016    Pneumococcal PPSV23/PCV13 65+ Years / Low and Medium Risk (2 of 2 - PPSV23) 05/10/2017    HEMOGLOBIN A1C  04/17/2018    Diabetic Foot Exam  08/10/2018    INFLUENZA VACCINE  09/28/2019 (Originally 9/1/2018)    URINE MICROALBUMIN  10/17/2018     Immunization History   Administered Date(s) Administered    Pneumococcal Conjugate 13-Valent 05/10/2016       Jer Otoole MD  [facility]

## 2018-09-28 NOTE — ASSESSMENT & PLAN NOTE
Lab Results   Component Value Date    HGBA1C 5 1 10/17/2017       No results for input(s): POCGLU in the last 72 hours      Blood Sugar Average: Last 72 hrs:   COMPLIANT WITH MEDICATION  DENIES ANY NUMBNESS, TINGLING IN FEET    - DISCUSSED DIETARY MODIFICATION OF CARBOHYDRATES  - HGB A1C AND URINE STUDIES DUE TODAY  - FOOT CARE  - RV 3 MONTHS

## 2019-03-04 DIAGNOSIS — I10 ESSENTIAL HYPERTENSION: ICD-10-CM

## 2019-03-04 DIAGNOSIS — E11.9 TYPE 2 DIABETES MELLITUS WITHOUT COMPLICATION, WITHOUT LONG-TERM CURRENT USE OF INSULIN (HCC): ICD-10-CM

## 2019-03-04 DIAGNOSIS — M15.9 PRIMARY OSTEOARTHRITIS INVOLVING MULTIPLE JOINTS: ICD-10-CM

## 2019-03-04 DIAGNOSIS — E03.9 ACQUIRED HYPOTHYROIDISM: ICD-10-CM

## 2019-03-04 RX ORDER — AMLODIPINE BESYLATE 10 MG/1
10 TABLET ORAL DAILY
Qty: 90 TABLET | Refills: 3 | Status: SHIPPED | OUTPATIENT
Start: 2019-03-04

## 2019-03-04 RX ORDER — LISINOPRIL 40 MG/1
40 TABLET ORAL DAILY
Qty: 90 TABLET | Refills: 3 | Status: SHIPPED | OUTPATIENT
Start: 2019-03-04

## 2019-03-04 RX ORDER — LEVOTHYROXINE SODIUM 175 UG/1
175 TABLET ORAL DAILY
Qty: 90 TABLET | Refills: 3 | Status: SHIPPED | OUTPATIENT
Start: 2019-03-04

## 2019-03-04 RX ORDER — MELOXICAM 15 MG/1
15 TABLET ORAL DAILY
Qty: 90 TABLET | Refills: 3 | Status: SHIPPED | OUTPATIENT
Start: 2019-03-04

## 2019-04-10 ENCOUNTER — TELEPHONE (OUTPATIENT)
Dept: FAMILY MEDICINE CLINIC | Facility: CLINIC | Age: 69
End: 2019-04-10

## 2019-04-10 DIAGNOSIS — B35.9 TINEA: Primary | ICD-10-CM

## 2019-04-10 RX ORDER — CLOTRIMAZOLE AND BETAMETHASONE DIPROPIONATE 10; .64 MG/G; MG/G
CREAM TOPICAL 2 TIMES DAILY
Qty: 30 G | Refills: 0 | Status: SHIPPED | OUTPATIENT
Start: 2019-04-10

## 2019-04-19 DIAGNOSIS — M10.09 IDIOPATHIC GOUT OF MULTIPLE SITES, UNSPECIFIED CHRONICITY: ICD-10-CM

## 2019-04-19 RX ORDER — ALLOPURINOL 100 MG/1
100 TABLET ORAL DAILY
Qty: 90 TABLET | Refills: 3 | Status: SHIPPED | OUTPATIENT
Start: 2019-04-19